# Patient Record
Sex: MALE | Race: WHITE | NOT HISPANIC OR LATINO | Employment: OTHER | URBAN - NONMETROPOLITAN AREA
[De-identification: names, ages, dates, MRNs, and addresses within clinical notes are randomized per-mention and may not be internally consistent; named-entity substitution may affect disease eponyms.]

---

## 2021-01-14 ENCOUNTER — IMMUNIZATION (OUTPATIENT)
Dept: VACCINE CLINIC | Facility: HOSPITAL | Age: 86
End: 2021-01-14

## 2021-01-14 PROCEDURE — 0001A: CPT | Performed by: FAMILY MEDICINE

## 2021-01-14 PROCEDURE — 91300 HC SARSCOV02 VAC 30MCG/0.3ML IM: CPT | Performed by: FAMILY MEDICINE

## 2021-02-04 ENCOUNTER — IMMUNIZATION (OUTPATIENT)
Dept: VACCINE CLINIC | Facility: HOSPITAL | Age: 86
End: 2021-02-04

## 2021-02-04 PROCEDURE — 0002A: CPT | Performed by: INTERNAL MEDICINE

## 2021-02-04 PROCEDURE — 91300 HC SARSCOV02 VAC 30MCG/0.3ML IM: CPT | Performed by: INTERNAL MEDICINE

## 2023-03-23 ENCOUNTER — OFFICE VISIT (OUTPATIENT)
Dept: CARDIOLOGY | Facility: CLINIC | Age: 88
End: 2023-03-23
Payer: MEDICARE

## 2023-03-23 VITALS
SYSTOLIC BLOOD PRESSURE: 174 MMHG | OXYGEN SATURATION: 98 % | HEART RATE: 51 BPM | WEIGHT: 140 LBS | BODY MASS INDEX: 25.76 KG/M2 | HEIGHT: 62 IN | DIASTOLIC BLOOD PRESSURE: 71 MMHG

## 2023-03-23 DIAGNOSIS — I27.20 SEVERE PULMONARY HYPERTENSION: ICD-10-CM

## 2023-03-23 DIAGNOSIS — I63.9 CEREBROVASCULAR ACCIDENT (CVA), UNSPECIFIED MECHANISM: Primary | ICD-10-CM

## 2023-03-23 PROCEDURE — 99204 OFFICE O/P NEW MOD 45 MIN: CPT | Performed by: NURSE PRACTITIONER

## 2023-03-23 PROCEDURE — 1159F MED LIST DOCD IN RCRD: CPT | Performed by: NURSE PRACTITIONER

## 2023-03-23 PROCEDURE — 1160F RVW MEDS BY RX/DR IN RCRD: CPT | Performed by: NURSE PRACTITIONER

## 2023-03-23 RX ORDER — HYDROCHLOROTHIAZIDE 12.5 MG/1
12.5 CAPSULE, GELATIN COATED ORAL
COMMUNITY
Start: 2023-01-12 | End: 2023-03-23

## 2023-03-23 RX ORDER — DOXAZOSIN 8 MG/1
8 TABLET ORAL DAILY
COMMUNITY
Start: 2022-12-19

## 2023-03-23 RX ORDER — ATORVASTATIN CALCIUM 40 MG/1
40 TABLET, FILM COATED ORAL DAILY
COMMUNITY
Start: 2023-02-05

## 2023-03-23 RX ORDER — FLUTICASONE PROPIONATE 110 UG/1
1 AEROSOL, METERED RESPIRATORY (INHALATION)
COMMUNITY
Start: 2022-04-28 | End: 2023-04-28

## 2023-03-23 RX ORDER — ASPIRIN 81 MG/1
81 TABLET ORAL DAILY
COMMUNITY

## 2023-03-23 RX ORDER — LISINOPRIL 40 MG/1
40 TABLET ORAL DAILY
COMMUNITY
Start: 2023-01-12

## 2023-03-23 RX ORDER — MONTELUKAST SODIUM 10 MG/1
10 TABLET ORAL DAILY
COMMUNITY
Start: 2023-01-12

## 2023-03-23 NOTE — PROGRESS NOTES
"Subjective     Kian Lopez is a 89 y.o. male who presents to [facility] for Establish Care (SoB, Edema, [facility] Echo).    CHIEF COMPLIANT  Chief Complaint   Patient presents with   • Establish Care     SoB, Edema, [facility] Echo       Active Problems:  Problem List Items Addressed This Visit    None  Visit Diagnoses     Cerebrovascular accident (CVA), unspecified mechanism (HCC)    -  Primary    Relevant Orders    Adult Transthoracic Echo Limited W/ Cont if Necessary Per Protocol    Severe pulmonary hypertension (HCC)        Relevant Orders    Adult Transthoracic Echo Limited W/ Cont if Necessary Per Protocol      Problem list  1.  Shortness of breath  1.1 echocardiogram 3/23: EF 60%, normal diastolic function, mild to moderate MR and TR, peak PA pressures of 66 mmHg  2.  Lower extremity edema  3.  Stroke  3.1 CTA of the neck no evidence of hemodynamically significant stenosis in the neck, small right pleural effusion  3.2  4.  Severe pulmonary hypertension    HPI  Kian Ventura is a 89-year-old male patient being seen today to establish care for dyspnea, edema, and chronic arterial hypertension.     Patient does have chronic arterial hypertension, which he is on lisinopril and Cardura. Today, his blood pressure is 174/71 mmHg with a heart rate of 51 beats per minute. He is accompanied by an adult female to this visit. He states that when he checks his systolic blood pressure, the second reading decreases to 139 mmHg once he sits down and is relaxed. The patient brought in a blood pressure log today.  His blood pressures were in the 130s and 140s over 70s to 80s on his blood pressure log. [facility] and reports 2 elevated readings.     He adds he was instructed to begin taking 81 mg of aspirin and [facility].    The patient states on 10/28/2022, his left eye \"blacked out\" for approximately 30 minutes. He notes the sensation started on the right side and spans " "across his face, terminating in his left eye. He adds a second episode occurred on 01/25/2023 and was diagnosed with a stroke and adds both episodes occurred while on Eliquis. He was seen by Dr. Mackenzie 10 days later and advised he has a \"blockage.\" He underwent an echocardiogram on 03/01/2023, which did not show any blockages. Dr. Mackenzie then ordered an MRI of his neck and head, which was performed at a local hospital and was unremarkable.  No indications of any hemodynamically significant stenosis in either carotid and CT of the head was normal for age.    The patient reports [facility], who was \"convinced\" he has a blockage despite unremarkable test results.     He reports having a left sided ischemic stroke 04/24/2022 and was told that he had an arrhythmia where the top part of his heart and the bottom part of his heart did not work together.  We did discuss atrial fibrillation however there is no documentation of atrial fibrillation in the records that are available to me.  We did discuss repeating cardiac event monitor however he wishes to defer.  He is on anticoagulation of Eliquis and antiplatelet therapy of Plavix.  He states he was hospitalized for 5 days and prescribed Eliquis for ischemic stroke. He denies residual weakness from the cerebrovascular accident.    The patient experiences shortness of breath with exertion. He denies episodes occurring at rest. He denies smothering when he lays on his back.  He reports that he can go to the gym and exercise 25 to 30 minutes without any problems.  He says he does the treadmill for 10 to 15 minutes on the bicycle for 15 to 20 minutes    He states he has had a heart murmur for 35 years. The patient wore a cardiac monitor in 2022 for 30 days.  He maintains that Dr. Mackenzie feels there is \"something wrong.\" He adds the [facility] noted cardiac dysfunction in the past.     The patient experiences bilateral pedal " "edema. He takes hydrochlorothiazide 12.5 mg but has been advised to switch to Lasix by [facility]. He reports that he will  the new prescription from the pharmacy.    PRIOR MEDS  Current Outpatient Medications on File Prior to Visit   Medication Sig Dispense Refill   • apixaban (ELIQUIS) 5 MG tablet tablet Take 1 tablet by mouth 2 (Two) Times a Day.     • aspirin 81 MG EC tablet Take 1 tablet by mouth Daily.     • atorvastatin (LIPITOR) 40 MG tablet Take 1 tablet by mouth Daily.     • doxazosin (CARDURA) 8 MG tablet Take 1 tablet by mouth Daily.     • fluticasone (FLOVENT HFA) 110 MCG/ACT inhaler Inhale 1 puff.     • Furosemide (LASIX PO) Take  by mouth.     • lisinopril (PRINIVIL,ZESTRIL) 40 MG tablet Take 1 tablet by mouth Daily.     • montelukast (SINGULAIR) 10 MG tablet Take 1 tablet by mouth Daily.       No current facility-administered medications on file prior to visit.       ALLERGIES  Patient has no known allergies.    HISTORY  Past Medical History:   Diagnosis Date   • Stroke (HCC)        Social History     Socioeconomic History   • Marital status:    Tobacco Use   • Smoking status: Never   Substance and Sexual Activity   • Alcohol use: Not Currently   • Drug use: Never   • Sexual activity: Defer       Family History   Problem Relation Age of Onset   • Heart attack Mother    • Heart attack Father        Review of Systems   Constitutional: Negative for fatigue.   HENT: Negative for congestion, rhinorrhea and sore throat.    Eyes: Positive for visual disturbance (states left eye \"blacks out\" for 30-40min. States he's been getting imaging to find out the cause. Stated he was told the cause is likely a blockage somewhere, but imaging didn't show anything. ).        Imaging for eye [facility] at [facility]    Respiratory: Positive for shortness of breath (Sometimes w/ activity or when outside ). Negative for chest tightness.    Cardiovascular: Positive for leg swelling (BLE edema, LLE is worse, " "does not go down overnight ). Negative for chest pain and palpitations.        Pt stated [facility], I told him we will try to obtain, but may need to perform one to review today     Gastrointestinal: Negative.    Genitourinary: Negative.    Neurological: Negative for dizziness, syncope, weakness, light-headedness, numbness and headaches.   Hematological: Bruises/bleeds easily.   Psychiatric/Behavioral: Negative for sleep disturbance.       Objective     VITALS: /71   Pulse 51   Ht 156.2 cm (61.5\")   Wt 63.5 kg (140 lb)   SpO2 98%   BMI 26.02 kg/m²     LABS:   Lab Results (most recent)     None          IMAGING:   No Images in the past 120 days found..    EXAM:  Physical Exam  Vitals and nursing note reviewed.   Constitutional:       Appearance: He is well-developed.   HENT:      Head: Normocephalic and atraumatic.   Eyes:      Pupils: Pupils are equal, round, and reactive to light.   Neck:      Vascular: Carotid bruit present. No JVD.   Cardiovascular:      Rate and Rhythm: Normal rate and regular rhythm.      Pulses:           Carotid pulses are 2+ on the right side and 2+ on the left side.       Radial pulses are 2+ on the right side and 2+ on the left side.        Posterior tibial pulses are 2+ on the right side and 2+ on the left side.      Heart sounds: Murmur ( 2/6 Systolic) heard.     No gallop.   Pulmonary:      Effort: Pulmonary effort is normal. No respiratory distress.      Breath sounds: Normal breath sounds.   Abdominal:      General: Bowel sounds are normal. There is no distension.      Palpations: Abdomen is soft.      Tenderness: There is no abdominal tenderness.   Musculoskeletal:         General: Swelling (2+ BLE) present. Normal range of motion.      Cervical back: Neck supple.   Skin:     General: Skin is warm and dry.   Neurological:      Mental Status: He is alert and oriented to person, place, and time.      Cranial Nerves: No cranial nerve deficit.      Sensory: No sensory " deficit.   Psychiatric:         Speech: Speech normal.         Behavior: Behavior normal.         Thought Content: Thought content normal.         Judgment: Judgment normal.         Procedure   Procedures     PLAN    Assessment & Plan    Diagnosis Plan   1. Cerebrovascular accident (CVA), unspecified mechanism (HCC)  Adult Transthoracic Echo Limited W/ Cont if Necessary Per Protocol      2. Severe pulmonary hypertension (HCC)  Adult Transthoracic Echo Limited W/ Cont if Necessary Per Protocol      Cardiac health maintenance  1. The patient's most recent echocardiogram results were discussed in full detail with the patient.  He does appear to have an ejection fraction of 60% with normal diastolic function with severe pulmonary hypertension of 66 mmHg.  Mild to moderate MR and TR.  We did discuss several different options in regards to his pulmonary hypertension however his shortness of breath is stable and he wishes just to proceed with medication management by switching the hydrochlorothiazide to Lasix at this time.  [facility].  2. Treatment options were discussed in detail with the patient.  We discussed potential echocardiogram with bubble study to rule out PFO, TIGRE, right and left heart catheterization.  We will move forth with echocardiogram with bubble study to rule out PFO.  3. An order has been placed for a transthoracic echocardiogram with bubble study to rule out PFO as a potential cause of his stroke..  4. The patient was advised to continue his current cardiac medication as prescribed.  He denies any bleeding with the Eliquis.  We do recommend that he continue this at this time.  5.  Patient's blood pressure is controlled on current blood pressure medication regimen.  No medication changes are warranted at this time.  Patient advised to monitor blood pressure on a daily basis and report any persistent highs or lows.  Set goal blood pressure for patient at 130/80 or below.  6.   [facility]  7.  Did discuss Mr. Lopez with Dr. Mills.  Bubble study to rule out PFO and continuation of Eliquis was advised.  8.  Informed of signs and symptoms of ACS and advised to seek emergent treatment for any new worsening symptoms.  Patient also advised sooner follow-up as needed.  Also advised to follow-up with family doctor as needed  This note is dictated utilizing voice recognition software.  Although this record has been proof read, transcriptional errors may still be present. If questions occur regarding the content of this record please do not hesitate to call our office.  I have reviewed and confirmed the accuracy of the ROS as documented by the MA/LPN/RN KIMBER Cruz  Assessment  1. Hypertension  2. Shortness of breath  3. Bilateral lower extremity edema    Return in about 3 months (around 6/23/2023), or if symptoms worsen or fail to improve.    Diagnoses and all orders for this visit:    1. Cerebrovascular accident (CVA), unspecified mechanism (HCC) (Primary)  -     Adult Transthoracic Echo Limited W/ Cont if Necessary Per Protocol; Future    2. Severe pulmonary hypertension (HCC)  -     Adult Transthoracic Echo Limited W/ Cont if Necessary Per Protocol; Future        Advance Care Planning   ACP discussion was declined by the patient. Patient has an advance directive (not in EMR), copy requested.         MEDS ORDERED DURING VISIT:  No orders of the defined types were placed in this encounter.          This document has been electronically signed by KIMBER Cruz Jr.  March 24, 2023 08:07 EDT  Transcribed from ambient dictation for KIMBER Cruz by Roxanne Gatuhier.  03/23/23   14:54 EDT    Patient or patient representative verbalized consent to the visit recording.  I have personally performed the services described in this document as transcribed by the above individual, and it is both accurate and complete.

## 2023-03-27 ENCOUNTER — HOSPITAL ENCOUNTER (OUTPATIENT)
Dept: CARDIOLOGY | Facility: HOSPITAL | Age: 88
Discharge: HOME | End: 2023-03-27
Payer: MEDICARE

## 2023-03-27 DIAGNOSIS — I27.20 SEVERE PULMONARY HYPERTENSION: ICD-10-CM

## 2023-03-27 DIAGNOSIS — I63.9 CEREBROVASCULAR ACCIDENT (CVA), UNSPECIFIED MECHANISM: ICD-10-CM

## 2023-03-27 PROCEDURE — 93308 TTE F-UP OR LMTD: CPT

## 2023-03-27 RX ORDER — SODIUM CHLORIDE 9 MG/ML
8 INJECTION INTRAMUSCULAR; INTRAVENOUS; SUBCUTANEOUS ONCE AS NEEDED
Status: COMPLETED | OUTPATIENT
Start: 2023-03-27 | End: 2023-03-27

## 2023-03-27 RX ADMIN — SODIUM CHLORIDE 8 ML: 9 INJECTION, SOLUTION INTRAMUSCULAR; INTRAVENOUS; SUBCUTANEOUS at 15:10

## 2023-03-29 ENCOUNTER — PATIENT ROUNDING (BHMG ONLY) (OUTPATIENT)
Dept: CARDIOLOGY | Facility: CLINIC | Age: 88
End: 2023-03-29
Payer: MEDICARE

## 2023-03-29 NOTE — PROGRESS NOTES
March 29, 2023    Hello, may I speak with Kian Lopez?    My name is TODD FARMER      I am  with [facility] SMRST ALVARADO  [facility]  [address] [place] [id number] [phone].    Before we get started may I verify your date of birth? 11/27/1930    I am calling to officially welcome you to our practice and ask about your recent visit. Is this a good time to talk? yes    Tell me about your visit with us. What things went well?  MY WIFE WAS A PT OF YOUR OFFICE SO I AM FAMILIAR WITH [facility].  I WAS IMPRESSED WITH JR.         We're always looking for ways to make our patients' experiences even better. Do you have recommendations on ways we may improve?  NOT REALLY.  I WAS GLAD TO KNOW HE CAN COMMUNICATE WITH DR. ALVARADO.    Overall were you satisfied with your first visit to our practice? Yes.  MY APPT WAS A LITTLE FAR OUT.         I appreciate you taking the time to speak with me today. Is there anything else I can do for you? no      Thank you, and have a great day.

## 2023-04-10 LAB
BH CV ECHO SHUNT ASSESSMENT PERFORMED (HIDDEN SCRIPTING): 1
MAXIMAL PREDICTED HEART RATE: 128 BPM
STRESS TARGET HR: 109 BPM

## 2023-04-11 ENCOUNTER — TELEPHONE (OUTPATIENT)
Dept: CARDIOLOGY | Facility: CLINIC | Age: 88
End: 2023-04-11
Payer: MEDICARE

## 2023-04-11 NOTE — TELEPHONE ENCOUNTER
Called pt to let him know we didn't have him previously scheduled with Dr. Mills, that he has an appt with Bayron Elise on 04/25/2023. Pt stated that the hospital spoke directly with Dr. Mills and he is supposed to see him. I let the pt know when the hospital says to follow up with Dr. Mills it's general and to see a provider at Dr. Mills's office. I let ot know DR. Mills is booked out to November and that he could see JR this time then follow up with Dr. Mills for his next follow up. Pt stated he would call the hospital to confirm what they said, and call us back. I let pt know I would leave his appt on with JR in April until we hear more.

## 2023-04-11 NOTE — TELEPHONE ENCOUNTER
Caller: Kian Lopez    Relationship: Self    Best call back number: [phone]      Who are you requesting to speak with (clinical staff, provider,  specific staff member): CLINICAL      What was the call regarding: THE PATIENT [facility] SE[facility] HIS DISCHARGE PAPERS STATE THAT HE HAS AN APPT ON 4-23-23 AT 8AM WITH DR. ALVARADO.  THE APPT IN THE CHART IS 4-25-23 WITH HARSH ARREDONDO.  PATIENT DOES NOT AGREE THAT THE 25TH IS THE ONLY APPT.  HE ONLY WANTS TO SEE DR. ALVARADO    Do you require a callback: YES

## 2023-04-11 NOTE — TELEPHONE ENCOUNTER
ECHO  Called patient to notify of no acute findings or abnormalities. Keep follow up as scheduled. If you have any problem between now and then give our office a call.   ----- Message from Sara Miller MA sent at 4/11/2023  9:05 AM EDT -----    ----- Message -----  From: Bayron Cooley APRN  Sent: 4/11/2023   1:15 AM EDT  To: Sara Miller MA    Negative bubble study with an ejection fraction of 61 to 65%

## 2023-04-24 ENCOUNTER — TELEPHONE (OUTPATIENT)
Dept: CARDIOLOGY | Facility: CLINIC | Age: 88
End: 2023-04-24
Payer: MEDICARE

## 2023-06-08 ENCOUNTER — OFFICE VISIT (OUTPATIENT)
Dept: CARDIOLOGY | Facility: CLINIC | Age: 88
End: 2023-06-08
Payer: MEDICARE

## 2023-06-08 VITALS
HEART RATE: 47 BPM | WEIGHT: 144.6 LBS | OXYGEN SATURATION: 97 % | HEIGHT: 61 IN | BODY MASS INDEX: 27.3 KG/M2 | DIASTOLIC BLOOD PRESSURE: 45 MMHG | SYSTOLIC BLOOD PRESSURE: 135 MMHG

## 2023-06-08 DIAGNOSIS — I63.9 CEREBROVASCULAR ACCIDENT (CVA), UNSPECIFIED MECHANISM: ICD-10-CM

## 2023-06-08 DIAGNOSIS — I48.0 PAROXYSMAL ATRIAL FIBRILLATION: ICD-10-CM

## 2023-06-08 DIAGNOSIS — I27.20 SEVERE PULMONARY HYPERTENSION: Primary | ICD-10-CM

## 2023-06-08 DIAGNOSIS — I10 ESSENTIAL HYPERTENSION: ICD-10-CM

## 2023-06-08 RX ORDER — AMLODIPINE BESYLATE 5 MG/1
TABLET ORAL
COMMUNITY
Start: 2023-06-07 | End: 2023-06-08

## 2023-06-08 RX ORDER — HYDRALAZINE HYDROCHLORIDE 50 MG/1
50 TABLET, FILM COATED ORAL 2 TIMES DAILY
Qty: 60 TABLET | Refills: 5 | Status: SHIPPED | OUTPATIENT
Start: 2023-06-08

## 2023-06-08 NOTE — PROGRESS NOTES
"Problem list     Subjective   Kian Lopez is a 89 y.o. male     Chief Complaint   Patient presents with    Follow-up     HOSPITAL F/U, 3 MTH ECHO RESULTS.         Problem list  1.  Shortness of breath  1.1 echocardiogram 3/23: EF 60%, normal diastolic function, mild to moderate MR and TR, peak PA pressures of 66 mmHg  2.  Lower extremity edema  3.  Stroke  3.1 CTA of the neck no evidence of hemodynamically significant stenosis in the neck, small right pleural effusion  3.2 echocardiogram with no evidence of shunting.  4.  Severe pulmonary hypertension  4.1 echocardiogram suggestive of pulmonary pressures in the 60s.  5.  Atrial fibrillation  5 1 patient currently on Eliquis for anticoagulation    HPI    Patient is a 89-year-old male who presents to the office to be evaluated.  Patient recently was hospitalized and discharged from hospital.    He has history of a CVA in the past.  He has been having episodes in which to have a sudden loss or transient loss of vision and he describes it as \"my left eye blacking out\".    This happened once at home and he went to the hospital and extensive work-up for stroke was negative.  MRI showed previous stroke with CTA of the head neck being unremarkable otherwise.  Echocardiogram has been performed outpatient with saline study which was negative for any evidence of shunting and patient has been on Eliquis for anticoagulation.    Patient does not experience any chest pain or pressure.  No complaints of any significant dyspnea.  No PND orthopnea.    He does palpitate at times.  He has had some dizziness on occasion especially upon standing.  He has had some lower extremity edema that has been concerning.  He is stable otherwise.          Current Outpatient Medications on File Prior to Visit   Medication Sig Dispense Refill    apixaban (ELIQUIS) 5 MG tablet tablet Take 1 tablet by mouth 2 (Two) Times a Day.      aspirin 81 MG EC tablet Take 1 tablet by mouth Daily.      " "atorvastatin (LIPITOR) 40 MG tablet Take 1 tablet by mouth Daily.      doxazosin (CARDURA) 8 MG tablet Take 1 tablet by mouth Daily.      Furosemide (LASIX PO) Take  by mouth.      lisinopril (PRINIVIL,ZESTRIL) 40 MG tablet Take 1 tablet by mouth Daily.      montelukast (SINGULAIR) 10 MG tablet Take 1 tablet by mouth Daily.      [DISCONTINUED] amLODIPine (NORVASC) 5 MG tablet       fluticasone (FLOVENT HFA) 110 MCG/ACT inhaler Inhale 1 puff.       No current facility-administered medications on file prior to visit.       Patient has no known allergies.    Past Medical History:   Diagnosis Date    Stroke        Social History     Socioeconomic History    Marital status:    Tobacco Use    Smoking status: Never   Substance and Sexual Activity    Alcohol use: Not Currently    Drug use: Never    Sexual activity: Defer       Family History   Problem Relation Age of Onset    Heart attack Mother     Heart attack Father        Review of Systems   Constitutional:  Negative for appetite change, chills and fever.   HENT: Negative.  Negative for drooling, facial swelling, mouth sores, postnasal drip, sinus pressure, sore throat and trouble swallowing.    Eyes:  Negative for pain, redness, itching and visual disturbance.   Respiratory:  Positive for shortness of breath. Negative for cough, choking and wheezing.    Cardiovascular:  Positive for palpitations and leg swelling. Negative for chest pain.   Gastrointestinal:  Negative for constipation, diarrhea and nausea.   Musculoskeletal:  Negative for back pain, myalgias and neck pain.   Skin:  Negative for rash and wound.   Neurological:  Positive for weakness. Negative for dizziness and numbness.   Psychiatric/Behavioral:  Negative for sleep disturbance.      Objective   Vitals:    06/08/23 1411   BP: 135/45   Pulse: (!) 47   SpO2: 97%   Weight: 65.6 kg (144 lb 9.6 oz)   Height: 156.2 cm (61.5\")      /45   Pulse (!) 47   Ht 156.2 cm (61.5\")   Wt 65.6 kg (144 lb 9.6 " oz)   SpO2 97%   BMI 26.88 kg/m²     Lab Results (most recent)       None            Physical Exam  Vitals and nursing note reviewed.   Constitutional:       General: He is not in acute distress.     Appearance: Normal appearance. He is well-developed.   HENT:      Head: Normocephalic and atraumatic.   Eyes:      General: No scleral icterus.        Right eye: No discharge.         Left eye: No discharge.      Conjunctiva/sclera: Conjunctivae normal.   Neck:      Vascular: No carotid bruit.   Cardiovascular:      Rate and Rhythm: Normal rate and regular rhythm.      Heart sounds: Normal heart sounds. No murmur heard.   with a grade of 1/6.     No friction rub. No gallop. S4 sounds present.      Comments: Left sternal border grade 1/6 to 2/6 systolic murmur  Pulmonary:      Effort: Pulmonary effort is normal. No respiratory distress.      Breath sounds: Normal breath sounds. No wheezing or rales.   Chest:      Chest wall: No tenderness.   Musculoskeletal:      Right lower leg: No edema.      Left lower leg: No edema.   Skin:     General: Skin is warm and dry.      Coloration: Skin is not pale.      Findings: No erythema or rash.   Neurological:      Mental Status: He is alert and oriented to person, place, and time.      Cranial Nerves: No cranial nerve deficit.   Psychiatric:         Behavior: Behavior normal.       Procedure   Procedures       Assessment & Plan     Problems Addressed this Visit          Cardiac and Vasculature    Paroxysmal atrial fibrillation    Essential hypertension    Relevant Medications    hydrALAZINE (APRESOLINE) 50 MG tablet       Neuro    Cerebrovascular accident (CVA)       Other    Severe pulmonary hypertension - Primary     Diagnoses         Codes Comments    Severe pulmonary hypertension    -  Primary ICD-10-CM: I27.20  ICD-9-CM: 416.8     Cerebrovascular accident (CVA), unspecified mechanism     ICD-10-CM: I63.9  ICD-9-CM: 434.91     Paroxysmal atrial fibrillation     ICD-10-CM:  I48.0  ICD-9-CM: 427.31     Essential hypertension     ICD-10-CM: I10  ICD-9-CM: 401.9           Recommendation  1.  Patient is a 89-year-old male with recent hospitalization but work-up being benign.  Antiplatelet therapy was added to his Eliquis and he has done better.  He does not describe any further episodes of loss of consciousness.  He is on Eliquis.  For now, we will make no changes.    2.  Patient with paroxysmal A-fib.  He is doing well on medical therapy.  No complaints of bleeding on Eliquis.  For now, we will monitor.    3.  Significant pulmonary hypertension at this time.  For now, we will treat his symptoms above extremity edema.    4.  Patient with baseline hypertension.  He also has significant lower extremity edema likely from diastolic dysfunction and pulmonary hypertension.  I am stopping amlodipine.  I feel this could be causing some of his edema and will him to continue Lasix.  I will place him on hydralazine to help with his blood pressure.  I want him to call back in 1 to 2 weeks in regards to blood pressure readings.    5.  Otherwise, we discussed repeat event monitoring to evaluate.  He describes having episodes in which he gets dizzy.  He does have some orthostatic issues but he does not seem interested.  Heart rate is also low today.  However, he does not seem interested though we have discussed event monitor more than once.  We will make this adjustment and if symptoms were to worsen, will him to call the office.  He is to follow-up with primary as scheduled.             Kian Lopez  reports that he has never smoked. He does not have any smokeless tobacco history on file..           Advance Care Planning   ACP discussion was declined by the patient. Patient has an advance directive in EMR which is still valid.          Electronically signed by:

## 2023-06-09 ENCOUNTER — TELEPHONE (OUTPATIENT)
Dept: CARDIOLOGY | Facility: CLINIC | Age: 88
End: 2023-06-09

## 2023-06-09 NOTE — TELEPHONE ENCOUNTER
Caller: Kian Lopez     Relationship: [unfilled]     Best call back number: [phone]    What is your medical concern? PATIENT CONCERNED WITH BLOOD PRESSURE. REPORTS HE TOOK IT TODAY AT 8:15AM AND HAD BP /49 HR 99. STATES HE TOOK IT AGAIN AT 8:20AM AND IT /50 .     Is your provider already aware of this issue? YES    Have you been treated for this issue? PATIENT IS TAKING LISINOPRIL 40MG.

## 2023-06-09 NOTE — TELEPHONE ENCOUNTER
A lot of times blood pressure can fluctuate especially with position changes if you are not well hydrated.  It looks as if the lisinopril has your blood pressure well controlled.  If you are having symptoms such as dizziness, lightheadedness, or feel like you are going to pass out we may need to decrease the overall dose of the lisinopril. 8

## 2023-06-09 NOTE — TELEPHONE ENCOUNTER
First attempt to reach pt. Someone answered, but did not speak when I spoke. Unable to reach them when I called back.   For the HUB to read to pt:   Please inform pt of the nessage from JR. Please also advise them not to check BP troo often as it does effect BP readings. Check BP BID or when having symptoms.

## 2023-08-08 ENCOUNTER — OFFICE VISIT (OUTPATIENT)
Dept: CARDIOLOGY | Facility: CLINIC | Age: 88
End: 2023-08-08
Payer: MEDICARE

## 2023-08-08 VITALS
SYSTOLIC BLOOD PRESSURE: 137 MMHG | WEIGHT: 132 LBS | HEIGHT: 61 IN | OXYGEN SATURATION: 97 % | DIASTOLIC BLOOD PRESSURE: 75 MMHG | BODY MASS INDEX: 24.92 KG/M2 | HEART RATE: 64 BPM

## 2023-08-08 DIAGNOSIS — I50.32 CHRONIC DIASTOLIC HEART FAILURE: ICD-10-CM

## 2023-08-08 DIAGNOSIS — I50.9 PLEURAL EFFUSION DUE TO CONGESTIVE HEART FAILURE: ICD-10-CM

## 2023-08-08 DIAGNOSIS — I27.20 SEVERE PULMONARY HYPERTENSION: Primary | ICD-10-CM

## 2023-08-08 PROCEDURE — 99214 OFFICE O/P EST MOD 30 MIN: CPT | Performed by: PHYSICIAN ASSISTANT

## 2023-08-08 RX ORDER — FLUTICASONE PROPIONATE 50 MCG
1 SPRAY, SUSPENSION (ML) NASAL DAILY
COMMUNITY

## 2023-08-08 RX ORDER — SACUBITRIL AND VALSARTAN 49; 51 MG/1; MG/1
1 TABLET, FILM COATED ORAL 2 TIMES DAILY
Qty: 60 TABLET | Refills: 5 | Status: SHIPPED | OUTPATIENT
Start: 2023-08-08

## 2023-08-08 RX ORDER — DAPAGLIFLOZIN 10 MG/1
1 TABLET, FILM COATED ORAL DAILY
Qty: 30 TABLET | Refills: 11 | Status: SHIPPED | OUTPATIENT
Start: 2023-08-08

## 2023-08-08 NOTE — PROGRESS NOTES
Problem list     Subjective   Kian Lopez is a 89 y.o. male     Chief Complaint   Patient presents with    Follow-up     [facility]-Follow up     Problem list  1.  Shortness of breath  1.1 echocardiogram 3/23: EF 60%, normal diastolic function, mild to moderate MR and TR, peak PA pressures of 66 mmHg  2.  Lower extremity edema  3.  Stroke  3.1 CTA of the neck no evidence of hemodynamically significant stenosis in the neck, small right pleural effusion  3.2 echocardiogram with no evidence of shunting.  4.  Severe pulmonary hypertension  4.1 echocardiogram suggestive of pulmonary pressures in the 60s.  5.  Atrial fibrillation  5 1 patient currently on Eliquis for anticoagulation  6.  Diastolic heart failure  7.  Pleural effusion status post thoracentesis July 2023  HPI    Patient is a 89-year-old male who presents to the office for evaluation.  As above, patient recently underwent thoracentesis.  Patient went to the hospital and was admitted and apparently underwent thoracentesis because of a large right pleural effusion with a small left pleural effusion.  Patient has followed with pulmonology.    He has done well.  He is on 40 mg of Lasix.  His LV function is normal.  He does not describe chest pain or pressure but can experience dyspnea at times.  He does have lower extremity edema that is concerning.  He does not describe PND orthopnea.    He does not describe palpitating nor does patient complain of dysrhythmic symptoms..  He is stable otherwise.      Current Outpatient Medications on File Prior to Visit   Medication Sig Dispense Refill    apixaban (ELIQUIS) 5 MG tablet tablet Take 1 tablet by mouth 2 (Two) Times a Day.      aspirin 81 MG EC tablet Take 1 tablet by mouth Daily.      atorvastatin (LIPITOR) 40 MG tablet Take 1 tablet by mouth Daily.      doxazosin (CARDURA) 8 MG tablet Take 1 tablet by mouth Daily.      fluticasone (FLONASE) 50 MCG/ACT nasal spray 1 spray into the nostril(s) as directed by provider  "Daily.      Furosemide (LASIX PO) Take 40 mg by mouth Daily.      hydrALAZINE (APRESOLINE) 50 MG tablet Take 1 tablet by mouth 2 (Two) Times a Day. 60 tablet 5    LEVOTHYROXINE SODIUM PO Take  by mouth.      montelukast (SINGULAIR) 10 MG tablet Take 1 tablet by mouth Daily.      Potassium (POTASSIMIN PO) Take  by mouth.      fluticasone (FLOVENT HFA) 110 MCG/ACT inhaler Inhale 1 puff.       No current facility-administered medications on file prior to visit.       Patient has no known allergies.    Past Medical History:   Diagnosis Date    Stroke        Social History     Socioeconomic History    Marital status:    Tobacco Use    Smoking status: Never   Substance and Sexual Activity    Alcohol use: Not Currently    Drug use: Never    Sexual activity: Defer       Family History   Problem Relation Age of Onset    Heart attack Mother     Heart attack Father        Review of Systems   Constitutional: Negative.    Eyes: Negative.  Negative for visual disturbance.   Respiratory:  Positive for shortness of breath. Negative for apnea, cough, chest tightness and wheezing.    Cardiovascular: Negative.  Negative for chest pain, palpitations and leg swelling.   Gastrointestinal: Negative.  Negative for blood in stool.   Endocrine: Negative.    Genitourinary: Negative.  Negative for hematuria.   Musculoskeletal: Negative.    Skin: Negative.  Negative for color change, rash and wound.   Allergic/Immunologic: Negative.    Neurological: Negative.  Negative for dizziness, syncope, weakness, light-headedness, numbness and headaches.   Hematological: Negative.  Bruises/bleeds easily.   Psychiatric/Behavioral: Negative.  Negative for sleep disturbance.      Objective   Vitals:    08/08/23 1547   BP: 137/75   BP Location: Left arm   Patient Position: Sitting   Cuff Size: Adult   Pulse: 64   SpO2: 97%   Weight: 59.9 kg (132 lb)   Height: 154.9 cm (61\")      /75 (BP Location: Left arm, Patient Position: Sitting, Cuff Size: " "Adult)   Pulse 64   Ht 154.9 cm (61\")   Wt 59.9 kg (132 lb)   SpO2 97%   BMI 24.94 kg/mý     Lab Results (most recent)       None            Physical Exam  Vitals and nursing note reviewed.   Constitutional:       General: He is not in acute distress.     Appearance: Normal appearance. He is well-developed.   HENT:      Head: Normocephalic and atraumatic.   Eyes:      General: No scleral icterus.        Right eye: No discharge.         Left eye: No discharge.      Conjunctiva/sclera: Conjunctivae normal.   Neck:      Vascular: No carotid bruit.   Cardiovascular:      Rate and Rhythm: Normal rate and regular rhythm.      Heart sounds: Normal heart sounds. No murmur heard.    No friction rub. No gallop. S4 sounds present.   Pulmonary:      Effort: Pulmonary effort is normal. No respiratory distress.      Breath sounds: Normal breath sounds. No wheezing or rales.   Chest:      Chest wall: No tenderness.   Musculoskeletal:      Right lower leg: Edema present.      Left lower leg: Edema present.   Skin:     General: Skin is warm and dry.      Coloration: Skin is not pale.      Findings: No erythema or rash.   Neurological:      Mental Status: He is alert and oriented to person, place, and time.      Cranial Nerves: No cranial nerve deficit.   Psychiatric:         Behavior: Behavior normal.       Procedure   Procedures       Assessment & Plan     Problems Addressed this Visit          Cardiac and Vasculature    Chronic diastolic heart failure    Relevant Medications    sacubitril-valsartan (Entresto) 49-51 MG tablet       Pulmonary and Pneumonias    Pleural effusion due to congestive heart failure    Relevant Medications    fluticasone (FLONASE) 50 MCG/ACT nasal spray       Other    Severe pulmonary hypertension - Primary     Diagnoses         Codes Comments    Severe pulmonary hypertension    -  Primary ICD-10-CM: I27.20  ICD-9-CM: 416.8     Chronic diastolic heart failure     ICD-10-CM: I50.32  ICD-9-CM: 428.32     " Pleural effusion due to congestive heart failure     ICD-10-CM: I50.9  ICD-9-CM: 428.0             Recommendation  1.  Patient is a 89-year-old male who presents back for follow-up.  He recently had thoracentesis because of a large right pleural effusion with a small left pleural effusion noted.  He has been on Lasix but continues to have 2-3+ lower extremity edema.  I am stopping lisinopril.  I want him to go on Entresto as well as farxiga.  I am scheduling a nurse visit 1 week in which we will likely repeat blood work to ensure no acidemia electrolyte abnormality.    2.  I feel patient might need increased dose of Lasix.  We would like to optimize medical therapy and see how he responds.    3.  Otherwise, patient has severe pulmonary hypertension but with his age, not sure how aggressive we would be in regards to treatment.  He follows with pulmonology.  Will defer to them at this time.    4.  For now, we will see him back for follow-up in 1 week for symptom check and for blood work.  For any worsening symptoms, I instructed him to call the office.    We instructed about heart failure and weighing himself daily.  Apparently, he was told to take Lasix daily and for increase in weight, double up on his Lasix for 2 to 3 days and then reduce down to 1 daily.  We will agree with this regimen at this time.  For now, he appears stable.  We will make adjustments and see him back for follow-up.  Follow-up with primary as scheduled.           Advance Care Planning   ACP discussion was declined by the patient. Patient has an advance directive (not in EMR), copy requested.    Patient brought in medicine list to appointment, it's been reviewed with patient and med list was updated in the chart.          Electronically signed by:

## 2023-08-08 NOTE — LETTER
August 9, 2023       No Recipients    Patient: Kian Lopez   YOB: 1930   Date of Visit: 8/8/2023       Dear Chon Borrego, DO    Kian Lopez was in my office today. Below is a copy of my note.    If you have questions, please do not hesitate to call me. I look forward to following Kian along with you.         Sincerely,        JANKI Roach        CC:   No Recipients    Problem list     Subjective  Kian Lopez is a 89 y.o. male     Chief Complaint   Patient presents with    Follow-up     [facility]-Follow up     Problem list  1.  Shortness of breath  1.1 echocardiogram 3/23: EF 60%, normal diastolic function, mild to moderate MR and TR, peak PA pressures of 66 mmHg  2.  Lower extremity edema  3.  Stroke  3.1 CTA of the neck no evidence of hemodynamically significant stenosis in the neck, small right pleural effusion  3.2 echocardiogram with no evidence of shunting.  4.  Severe pulmonary hypertension  4.1 echocardiogram suggestive of pulmonary pressures in the 60s.  5.  Atrial fibrillation  5 1 patient currently on Eliquis for anticoagulation  6.  Diastolic heart failure  7.  Pleural effusion status post thoracentesis July 2023  HPI    Patient is a 89-year-old male who presents to the office for evaluation.  As above, patient recently underwent thoracentesis.  Patient went to the hospital and was admitted and apparently underwent thoracentesis because of a large right pleural effusion with a small left pleural effusion.  Patient has followed with pulmonology.    He has done well.  He is on 40 mg of Lasix.  His LV function is normal.  He does not describe chest pain or pressure but can experience dyspnea at times.  He does have lower extremity edema that is concerning.  He does not describe PND orthopnea.    He does not describe palpitating nor does patient complain of dysrhythmic symptoms..  He is stable otherwise.      Current Outpatient Medications on File Prior to Visit   Medication Sig  Dispense Refill    apixaban (ELIQUIS) 5 MG tablet tablet Take 1 tablet by mouth 2 (Two) Times a Day.      aspirin 81 MG EC tablet Take 1 tablet by mouth Daily.      atorvastatin (LIPITOR) 40 MG tablet Take 1 tablet by mouth Daily.      doxazosin (CARDURA) 8 MG tablet Take 1 tablet by mouth Daily.      fluticasone (FLONASE) 50 MCG/ACT nasal spray 1 spray into the nostril(s) as directed by provider Daily.      Furosemide (LASIX PO) Take 40 mg by mouth Daily.      hydrALAZINE (APRESOLINE) 50 MG tablet Take 1 tablet by mouth 2 (Two) Times a Day. 60 tablet 5    LEVOTHYROXINE SODIUM PO Take  by mouth.      montelukast (SINGULAIR) 10 MG tablet Take 1 tablet by mouth Daily.      Potassium (POTASSIMIN PO) Take  by mouth.      fluticasone (FLOVENT HFA) 110 MCG/ACT inhaler Inhale 1 puff.       No current facility-administered medications on file prior to visit.       Patient has no known allergies.    Past Medical History:   Diagnosis Date    Stroke        Social History     Socioeconomic History    Marital status:    Tobacco Use    Smoking status: Never   Substance and Sexual Activity    Alcohol use: Not Currently    Drug use: Never    Sexual activity: Defer       Family History   Problem Relation Age of Onset    Heart attack Mother     Heart attack Father        Review of Systems   Constitutional: Negative.    Eyes: Negative.  Negative for visual disturbance.   Respiratory:  Positive for shortness of breath. Negative for apnea, cough, chest tightness and wheezing.    Cardiovascular: Negative.  Negative for chest pain, palpitations and leg swelling.   Gastrointestinal: Negative.  Negative for blood in stool.   Endocrine: Negative.    Genitourinary: Negative.  Negative for hematuria.   Musculoskeletal: Negative.    Skin: Negative.  Negative for color change, rash and wound.   Allergic/Immunologic: Negative.    Neurological: Negative.  Negative for dizziness, syncope, weakness, light-headedness,  "numbness and headaches.   Hematological: Negative.  Bruises/bleeds easily.   Psychiatric/Behavioral: Negative.  Negative for sleep disturbance.      Objective  Vitals:    08/08/23 1547   BP: 137/75   BP Location: Left arm   Patient Position: Sitting   Cuff Size: Adult   Pulse: 64   SpO2: 97%   Weight: 59.9 kg (132 lb)   Height: 154.9 cm (61\")      /75 (BP Location: Left arm, Patient Position: Sitting, Cuff Size: Adult)   Pulse 64   Ht 154.9 cm (61\")   Wt 59.9 kg (132 lb)   SpO2 97%   BMI 24.94 kg/mý     Lab Results (most recent)       None            Physical Exam  Vitals and nursing note reviewed.   Constitutional:       General: He is not in acute distress.     Appearance: Normal appearance. He is well-developed.   HENT:      Head: Normocephalic and atraumatic.   Eyes:      General: No scleral icterus.        Right eye: No discharge.         Left eye: No discharge.      Conjunctiva/sclera: Conjunctivae normal.   Neck:      Vascular: No carotid bruit.   Cardiovascular:      Rate and Rhythm: Normal rate and regular rhythm.      Heart sounds: Normal heart sounds. No murmur heard.    No friction rub. No gallop. S4 sounds present.   Pulmonary:      Effort: Pulmonary effort is normal. No respiratory distress.      Breath sounds: Normal breath sounds. No wheezing or rales.   Chest:      Chest wall: No tenderness.   Musculoskeletal:      Right lower leg: Edema present.      Left lower leg: Edema present.   Skin:     General: Skin is warm and dry.      Coloration: Skin is not pale.      Findings: No erythema or rash.   Neurological:      Mental Status: He is alert and oriented to person, place, and time.      Cranial Nerves: No cranial nerve deficit.   Psychiatric:         Behavior: Behavior normal.       Procedure  Procedures       Assessment & Plan    Problems Addressed this Visit          Cardiac and Vasculature    Chronic diastolic heart failure    Relevant Medications    sacubitril-valsartan (Entresto) " 49-51 MG tablet       Pulmonary and Pneumonias    Pleural effusion due to congestive heart failure    Relevant Medications    fluticasone (FLONASE) 50 MCG/ACT nasal spray       Other    Severe pulmonary hypertension - Primary     Diagnoses         Codes Comments    Severe pulmonary hypertension    -  Primary ICD-10-CM: I27.20  ICD-9-CM: 416.8     Chronic diastolic heart failure     ICD-10-CM: I50.32  ICD-9-CM: 428.32     Pleural effusion due to congestive heart failure     ICD-10-CM: I50.9  ICD-9-CM: 428.0             Recommendation  1.  Patient is a 89-year-old male who presents back for follow-up.  He recently had thoracentesis because of a large right pleural effusion with a small left pleural effusion noted.  He has been on Lasix but continues to have 2-3+ lower extremity edema.  I am stopping lisinopril.  I want him to go on Entresto as well as farxiga.  I am scheduling a nurse visit 1 week in which we will likely repeat blood work to ensure no acidemia electrolyte abnormality.    2.  I feel patient might need increased dose of Lasix.  We would like to optimize medical therapy and see how he responds.    3.  Otherwise, patient has severe pulmonary hypertension but with his age, not sure how aggressive we would be in regards to treatment.  He follows with pulmonology.  Will defer to them at this time.    4.  For now, we will see him back for follow-up in 1 week for symptom check and for blood work.  For any worsening symptoms, I instructed him to call the office.    We instructed about heart failure and weighing himself daily.  Apparently, he was told to take Lasix daily and for increase in weight, double up on his Lasix for 2 to 3 days and then reduce down to 1 daily.  We will agree with this regimen at this time.  For now, he appears stable.  We will make adjustments and see him back for follow-up.  Follow-up with primary as scheduled.           Advance Care Planning   ACP discussion was declined by the  patient. Patient has an advance directive (not in EMR), copy requested.    Patient brought in medicine list to appointment, it's been reviewed with patient and med list was updated in the chart.          Electronically signed by:

## 2023-08-09 PROBLEM — I50.32 CHRONIC DIASTOLIC HEART FAILURE: Status: ACTIVE | Noted: 2023-08-09

## 2023-08-09 PROBLEM — I50.9 PLEURAL EFFUSION DUE TO CONGESTIVE HEART FAILURE: Status: ACTIVE | Noted: 2023-08-09

## 2023-08-22 ENCOUNTER — CLINICAL SUPPORT (OUTPATIENT)
Dept: CARDIOLOGY | Facility: CLINIC | Age: 88
End: 2023-08-22

## 2023-08-22 ENCOUNTER — LAB (OUTPATIENT)
Dept: CARDIOLOGY | Facility: CLINIC | Age: 88
End: 2023-08-22
Payer: MEDICARE

## 2023-08-22 VITALS
HEIGHT: 61 IN | HEART RATE: 56 BPM | OXYGEN SATURATION: 97 % | WEIGHT: 135.2 LBS | BODY MASS INDEX: 25.53 KG/M2 | DIASTOLIC BLOOD PRESSURE: 48 MMHG | SYSTOLIC BLOOD PRESSURE: 129 MMHG

## 2023-08-22 DIAGNOSIS — I10 ESSENTIAL HYPERTENSION: ICD-10-CM

## 2023-08-22 DIAGNOSIS — R06.02 SOB (SHORTNESS OF BREATH): ICD-10-CM

## 2023-08-22 DIAGNOSIS — I50.32 CHRONIC DIASTOLIC HEART FAILURE: Primary | ICD-10-CM

## 2023-08-22 DIAGNOSIS — I50.32 CHRONIC DIASTOLIC HEART FAILURE: ICD-10-CM

## 2023-08-22 LAB
ANION GAP SERPL CALCULATED.3IONS-SCNC: 10.6 MMOL/L (ref 5–15)
BUN SERPL-MCNC: 18 MG/DL (ref 8–23)
BUN/CREAT SERPL: 17 (ref 7–25)
CALCIUM SPEC-SCNC: 9.4 MG/DL (ref 8.2–9.6)
CHLORIDE SERPL-SCNC: 107 MMOL/L (ref 98–107)
CO2 SERPL-SCNC: 25.4 MMOL/L (ref 22–29)
CREAT SERPL-MCNC: 1.06 MG/DL (ref 0.76–1.27)
EGFRCR SERPLBLD CKD-EPI 2021: 65.8 ML/MIN/1.73
GLUCOSE SERPL-MCNC: 99 MG/DL (ref 65–99)
NT-PROBNP SERPL-MCNC: 1911 PG/ML (ref 0–1800)
POTASSIUM SERPL-SCNC: 4.6 MMOL/L (ref 3.5–5.2)
SODIUM SERPL-SCNC: 143 MMOL/L (ref 136–145)

## 2023-08-22 PROCEDURE — 80048 BASIC METABOLIC PNL TOTAL CA: CPT | Performed by: PHYSICIAN ASSISTANT

## 2023-08-22 PROCEDURE — 83880 ASSAY OF NATRIURETIC PEPTIDE: CPT | Performed by: PHYSICIAN ASSISTANT

## 2023-08-22 RX ORDER — DAPAGLIFLOZIN 10 MG/1
1 TABLET, FILM COATED ORAL DAILY
Qty: 90 TABLET | Refills: 3 | Status: SHIPPED | OUTPATIENT
Start: 2023-08-22 | End: 2023-09-06 | Stop reason: ALTCHOICE

## 2023-08-22 RX ORDER — SACUBITRIL AND VALSARTAN 49; 51 MG/1; MG/1
1 TABLET, FILM COATED ORAL 2 TIMES DAILY
Qty: 180 TABLET | Refills: 3 | Status: SHIPPED | OUTPATIENT
Start: 2023-08-22

## 2023-08-22 NOTE — PROGRESS NOTES
"Kian Lopez  11/27/1930 8/22/2023   ?   Chief Complaint   Patient presents with    Hypertension     BP check      ?   HPI:   ?   Per 8/8 office visit:    \"1.  Patient is a 89-year-old male who presents back for follow-up.  He recently had thoracentesis because of a large right pleural effusion with a small left pleural effusion noted.  He has been on Lasix but continues to have 2-3+ lower extremity edema.  I am stopping lisinopril.  I want him to go on Entresto as well as farxiga.  I am scheduling a nurse visit 1 week in which we will likely repeat blood work to ensure no acidemia electrolyte abnormality.     2.  I feel patient might need increased dose of Lasix.  We would like to optimize medical therapy and see how he responds.     3.  Otherwise, patient has severe pulmonary hypertension but with his age, not sure how aggressive we would be in regards to treatment.  He follows with pulmonology.  Will defer to them at this time.     4.  For now, we will see him back for follow-up in 1 week for symptom check and for blood work.  For any worsening symptoms, I instructed him to call the office.     We instructed about heart failure and weighing himself daily.  Apparently, he was told to take Lasix daily and for increase in weight, double up on his Lasix for 2 to 3 days and then reduce down to 1 daily.  We will agree with this regimen at this time.  For now, he appears stable.  We will make adjustments and see him back for follow-up.  Follow-up with primary as scheduled.\"?   ?    Pt states that he is feeling much better since his last visit with no symptoms to speak of.         Current Outpatient Medications:     apixaban (ELIQUIS) 5 MG tablet tablet, Take 1 tablet by mouth 2 (Two) Times a Day., Disp: , Rfl:     aspirin 81 MG EC tablet, Take 1 tablet by mouth Daily., Disp: , Rfl:     atorvastatin (LIPITOR) 40 MG tablet, Take 1 tablet by mouth Daily., Disp: , Rfl:     dapagliflozin Propanediol (Farxiga) 10 MG tablet, " Take 10 mg by mouth Daily., Disp: 30 tablet, Rfl: 11    doxazosin (CARDURA) 8 MG tablet, Take 1 tablet by mouth Daily., Disp: , Rfl:     fluticasone (FLONASE) 50 MCG/ACT nasal spray, 1 spray into the nostril(s) as directed by provider Daily., Disp: , Rfl:     fluticasone (FLOVENT HFA) 110 MCG/ACT inhaler, Inhale 1 puff., Disp: , Rfl:     Furosemide (LASIX PO), Take 40 mg by mouth Daily., Disp: , Rfl:     hydrALAZINE (APRESOLINE) 50 MG tablet, Take 1 tablet by mouth 2 (Two) Times a Day., Disp: 60 tablet, Rfl: 5    LEVOTHYROXINE SODIUM PO, Take  by mouth., Disp: , Rfl:     montelukast (SINGULAIR) 10 MG tablet, Take 1 tablet by mouth Daily., Disp: , Rfl:     Potassium (POTASSIMIN PO), Take  by mouth., Disp: , Rfl:     sacubitril-valsartan (Entresto) 49-51 MG tablet, Take 1 tablet by mouth 2 (Two) Times a Day., Disp: 60 tablet, Rfl: 5   ?   ?   Patient has no known allergies.       Procedures     ?   Assessment & Plan    ?   ? Per Chaparro Fajardo, Pt is to continue current medication regimen and follow up routinely.    Pt verbalized understanding of instructions and will contact the office if symptoms worsen.    Shaw Hensley MA  ?

## 2023-08-25 ENCOUNTER — TELEPHONE (OUTPATIENT)
Dept: CARDIOLOGY | Facility: CLINIC | Age: 88
End: 2023-08-25
Payer: MEDICARE

## 2023-08-25 NOTE — TELEPHONE ENCOUNTER
Pt came into the office to check on lab results, let pt know they weren't ready and that juan francisco would be back in the office monday

## 2023-09-06 ENCOUNTER — TELEPHONE (OUTPATIENT)
Dept: CARDIOLOGY | Facility: CLINIC | Age: 88
End: 2023-09-06
Payer: MEDICARE

## 2023-09-06 NOTE — TELEPHONE ENCOUNTER
[facility] called to see if Farxiga could be changed to Jardiance as it is preferred and covered with [facility].    OK per Chaparro Fajardo PA-C to change as requested.

## 2023-09-07 ENCOUNTER — DOCUMENTATION (OUTPATIENT)
Dept: CARDIOLOGY | Facility: CLINIC | Age: 88
End: 2023-09-07
Payer: MEDICARE

## 2023-09-07 NOTE — PROGRESS NOTES
L/M CHECKING ON STATUS OF JARDIANCE APPROVAL THROUGH [facility]. PER NOTE YEST. MED NOT COVERED BU JARDIANCE IS. SO SCRIPT FOR JARDIANCE WAS SENT TO [facility]. L/M AT NUMBER LEFT BY PATIENT EXPLAINING ABOVE. PH,LPN

## 2023-11-08 ENCOUNTER — OFFICE VISIT (OUTPATIENT)
Dept: CARDIOLOGY | Facility: CLINIC | Age: 88
End: 2023-11-08
Payer: MEDICARE

## 2023-11-08 VITALS
DIASTOLIC BLOOD PRESSURE: 51 MMHG | HEART RATE: 66 BPM | OXYGEN SATURATION: 97 % | HEIGHT: 64 IN | WEIGHT: 127 LBS | BODY MASS INDEX: 21.68 KG/M2 | SYSTOLIC BLOOD PRESSURE: 116 MMHG

## 2023-11-08 DIAGNOSIS — I48.0 PAROXYSMAL ATRIAL FIBRILLATION: ICD-10-CM

## 2023-11-08 DIAGNOSIS — I50.32 CHRONIC DIASTOLIC HEART FAILURE: Primary | ICD-10-CM

## 2023-11-08 DIAGNOSIS — I27.20 SEVERE PULMONARY HYPERTENSION: ICD-10-CM

## 2023-11-08 PROCEDURE — 99214 OFFICE O/P EST MOD 30 MIN: CPT | Performed by: PHYSICIAN ASSISTANT

## 2023-11-08 NOTE — PROGRESS NOTES
Problem list     Subjective   Kian Lopez is a 89 y.o. male     Chief Complaint   Patient presents with    Follow-up   Problem list  1.  Shortness of breath  1.1 echocardiogram 3/23: EF 60%, normal diastolic function, mild to moderate MR and TR, peak PA pressures of 66 mmHg  2.  Lower extremity edema  3.  Stroke  3.1 CTA of the neck no evidence of hemodynamically significant stenosis in the neck, small right pleural effusion  3.2 echocardiogram with no evidence of shunting.  4.  Severe pulmonary hypertension  4.1 echocardiogram suggestive of pulmonary pressures in the 60s.  5.  Atrial fibrillation  5 1 patient currently on Eliquis for anticoagulation  6.  Diastolic heart failure  7.  Pleural effusion status post thoracentesis July 2023    HPI    Patient is a 89-year-old male that presents back to the office for routine cardiac follow-up.  He has a degree of diastolic heart failure and right-sided failure from severe pulmonary hypertension.  He follows with pulmonology as well.    We had adjusted medication recently placing patient on an SGLT2 inhibitor as well as Entresto.    Patient has done remarkably well.  His edema has improved and his dyspnea.  He does not describe chest pain or pressure.  He still has a degree of dyspnea if he was to exert for extended levels.  He does not describe PND or orthopnea.    He does not palpitate or complain of dysrhythmic symptoms.  He has done well otherwise.      Current Outpatient Medications on File Prior to Visit   Medication Sig Dispense Refill    apixaban (ELIQUIS) 2.5 MG tablet tablet Take 1 tablet by mouth 2 (Two) Times a Day.      aspirin 81 MG EC tablet Take 1 tablet by mouth Daily.      atorvastatin (LIPITOR) 40 MG tablet Take 1 tablet by mouth Daily.      doxazosin (CARDURA) 8 MG tablet Take 1 tablet by mouth Daily.      empagliflozin (JARDIANCE) 10 MG tablet tablet Take 1 tablet by mouth Daily. 90 tablet 3    fluticasone (FLONASE) 50 MCG/ACT nasal spray 1 spray into  the nostril(s) as directed by provider Daily.      Furosemide (LASIX PO) Take 40 mg by mouth Daily.      hydrALAZINE (APRESOLINE) 50 MG tablet Take 1 tablet by mouth 2 (Two) Times a Day. 60 tablet 5    LEVOTHYROXINE SODIUM PO Take  by mouth.      montelukast (SINGULAIR) 10 MG tablet Take 1 tablet by mouth Daily.      Potassium (POTASSIMIN PO) Take  by mouth.      sacubitril-valsartan (Entresto) 49-51 MG tablet Take 1 tablet by mouth 2 (Two) Times a Day. 180 tablet 3    fluticasone (FLOVENT HFA) 110 MCG/ACT inhaler Inhale 1 puff.      [DISCONTINUED] apixaban (ELIQUIS) 5 MG tablet tablet Take 1 tablet by mouth 2 (Two) Times a Day.       No current facility-administered medications on file prior to visit.       Patient has no known allergies.    Past Medical History:   Diagnosis Date    Stroke        Social History     Socioeconomic History    Marital status:    Tobacco Use    Smoking status: Never   Substance and Sexual Activity    Alcohol use: Not Currently    Drug use: Never    Sexual activity: Defer       Family History   Problem Relation Age of Onset    Heart attack Mother     Heart attack Father        Review of Systems   Constitutional: Negative.    HENT: Negative.     Eyes: Negative.    Respiratory:  Positive for shortness of breath. Negative for apnea, cough, chest tightness and wheezing.    Cardiovascular: Negative.  Negative for chest pain, palpitations and leg swelling.   Gastrointestinal: Negative.  Negative for blood in stool.   Endocrine: Negative.    Genitourinary: Negative.  Negative for hematuria.   Musculoskeletal: Negative.    Skin: Negative.  Negative for color change, rash and wound.   Allergic/Immunologic: Negative.    Neurological:  Negative for dizziness, syncope, weakness, light-headedness and numbness.   Hematological: Negative.  Does not bruise/bleed easily.   Psychiatric/Behavioral:  Negative for sleep disturbance.        Objective   Vitals:    11/08/23 1404   BP: 116/51   Pulse: 66  "  SpO2: 97%   Weight: 57.6 kg (127 lb)   Height: 162.6 cm (64\")      /51   Pulse 66   Ht 162.6 cm (64\")   Wt 57.6 kg (127 lb)   SpO2 97%   BMI 21.80 kg/m²     Lab Results (most recent)       None            Physical Exam  Vitals and nursing note reviewed.   Constitutional:       General: He is not in acute distress.     Appearance: Normal appearance. He is well-developed.   HENT:      Head: Normocephalic and atraumatic.   Eyes:      General: No scleral icterus.        Right eye: No discharge.         Left eye: No discharge.      Conjunctiva/sclera: Conjunctivae normal.   Neck:      Vascular: No carotid bruit.   Cardiovascular:      Rate and Rhythm: Normal rate and regular rhythm.      Heart sounds: Normal heart sounds. No murmur heard.     No friction rub. No gallop.      Comments: Trace to mild edema bilaterally in the lower extremities  Pulmonary:      Effort: Pulmonary effort is normal. No respiratory distress.      Breath sounds: Normal breath sounds. No wheezing or rales.   Chest:      Chest wall: No tenderness.   Musculoskeletal:      Right lower leg: No edema.      Left lower leg: No edema.   Skin:     General: Skin is warm and dry.      Coloration: Skin is not pale.      Findings: No erythema or rash.   Neurological:      Mental Status: He is alert and oriented to person, place, and time.      Cranial Nerves: No cranial nerve deficit.   Psychiatric:         Behavior: Behavior normal.         Procedure   Procedures       Assessment & Plan     Problems Addressed this Visit          Cardiac and Vasculature    Paroxysmal atrial fibrillation    Chronic diastolic heart failure - Primary       Other    Severe pulmonary hypertension     Diagnoses         Codes Comments    Chronic diastolic heart failure    -  Primary ICD-10-CM: I50.32  ICD-9-CM: 428.32     Severe pulmonary hypertension     ICD-10-CM: I27.20  ICD-9-CM: 416.8     Paroxysmal atrial fibrillation     ICD-10-CM: I48.0  ICD-9-CM: 427.31       "     Recommendation  1.  Patient is a 89-year-old male who presents back to the office for follow-up.  He has history of chronic diastolic heart failure but appears to be euvolemic with mild edema in lower extremities.  For now, with improvement of symptoms, we will continue to manage medically.    2.  Patient with atrial fibrillation doing well on current treatment with no complaints of bleeding on Eliquis nor symptoms of cerebral embolic events.  We will monitor for now.    3.  Patient with pulmonary hypertension follows with pulmonology.  We can continue to monitor.  They will optimize him from a pulmonary standpoint.  We can continue to monitor pulmonary pressures with echocardiograms.  He seems to have improved clinically.  We will see him back for follow-up in 6 months or sooner as symptoms discussed.  Follow-up with primary as scheduled.           Patient did not bring med list or medicine bottles to appointment, med list has been reviewed and updated based on patient's knowledge of their meds.      Advance Care Planning   ACP discussion was declined by the patient. Patient has an advance directive (not in EMR), copy requested.      Electronically signed by:

## 2023-11-08 NOTE — LETTER
November 8, 2023       No Recipients    Patient: Kian Lopez   YOB: 1930   Date of Visit: 11/8/2023       Dear Chon Borrego, DO    Kian Lopez was in my office today. Below is a copy of my note.    If you have questions, please do not hesitate to call me. I look forward to following Kian along with you.         Sincerely,        JANKI Roach        CC:   No Recipients    Problem list     Subjective  Kian Lopez is a 89 y.o. male     Chief Complaint   Patient presents with   • Follow-up   Problem list  1.  Shortness of breath  1.1 echocardiogram 3/23: EF 60%, normal diastolic function, mild to moderate MR and TR, peak PA pressures of 66 mmHg  2.  Lower extremity edema  3.  Stroke  3.1 CTA of the neck no evidence of hemodynamically significant stenosis in the neck, small right pleural effusion  3.2 echocardiogram with no evidence of shunting.  4.  Severe pulmonary hypertension  4.1 echocardiogram suggestive of pulmonary pressures in the 60s.  5.  Atrial fibrillation  5 1 patient currently on Eliquis for anticoagulation  6.  Diastolic heart failure  7.  Pleural effusion status post thoracentesis July 2023    HPI    Patient is a 89-year-old male that presents back to the office for routine cardiac follow-up.  He has a degree of diastolic heart failure and right-sided failure from severe pulmonary hypertension.  He follows with pulmonology as well.    We had adjusted medication recently placing patient on an SGLT2 inhibitor as well as Entresto.    Patient has done remarkably well.  His edema has improved and his dyspnea.  He does not describe chest pain or pressure.  He still has a degree of dyspnea if he was to exert for extended levels.  He does not describe PND or orthopnea.    He does not palpitate or complain of dysrhythmic symptoms.  He has done well otherwise.      Current Outpatient Medications on File Prior to Visit   Medication Sig Dispense Refill   • apixaban (ELIQUIS) 2.5 MG  tablet tablet Take 1 tablet by mouth 2 (Two) Times a Day.     • aspirin 81 MG EC tablet Take 1 tablet by mouth Daily.     • atorvastatin (LIPITOR) 40 MG tablet Take 1 tablet by mouth Daily.     • doxazosin (CARDURA) 8 MG tablet Take 1 tablet by mouth Daily.     • empagliflozin (JARDIANCE) 10 MG tablet tablet Take 1 tablet by mouth Daily. 90 tablet 3   • fluticasone (FLONASE) 50 MCG/ACT nasal spray 1 spray into the nostril(s) as directed by provider Daily.     • Furosemide (LASIX PO) Take 40 mg by mouth Daily.     • hydrALAZINE (APRESOLINE) 50 MG tablet Take 1 tablet by mouth 2 (Two) Times a Day. 60 tablet 5   • LEVOTHYROXINE SODIUM PO Take  by mouth.     • montelukast (SINGULAIR) 10 MG tablet Take 1 tablet by mouth Daily.     • Potassium (POTASSIMIN PO) Take  by mouth.     • sacubitril-valsartan (Entresto) 49-51 MG tablet Take 1 tablet by mouth 2 (Two) Times a Day. 180 tablet 3   • fluticasone (FLOVENT HFA) 110 MCG/ACT inhaler Inhale 1 puff.     • [DISCONTINUED] apixaban (ELIQUIS) 5 MG tablet tablet Take 1 tablet by mouth 2 (Two) Times a Day.       No current facility-administered medications on file prior to visit.       Patient has no known allergies.    Past Medical History:   Diagnosis Date   • Stroke        Social History     Socioeconomic History   • Marital status:    Tobacco Use   • Smoking status: Never   Substance and Sexual Activity   • Alcohol use: Not Currently   • Drug use: Never   • Sexual activity: Defer       Family History   Problem Relation Age of Onset   • Heart attack Mother    • Heart attack Father        Review of Systems   Constitutional: Negative.    HENT: Negative.     Eyes: Negative.    Respiratory:  Positive for shortness of breath. Negative for apnea, cough, chest tightness and wheezing.    Cardiovascular: Negative.  Negative for chest pain, palpitations and leg swelling.   Gastrointestinal: Negative.  Negative for blood in stool.   Endocrine: Negative.    Genitourinary: Negative.  " Negative for hematuria.   Musculoskeletal: Negative.    Skin: Negative.  Negative for color change, rash and wound.   Allergic/Immunologic: Negative.    Neurological:  Negative for dizziness, syncope, weakness, light-headedness and numbness.   Hematological: Negative.  Does not bruise/bleed easily.   Psychiatric/Behavioral:  Negative for sleep disturbance.        Objective  Vitals:    11/08/23 1404   BP: 116/51   Pulse: 66   SpO2: 97%   Weight: 57.6 kg (127 lb)   Height: 162.6 cm (64\")      /51   Pulse 66   Ht 162.6 cm (64\")   Wt 57.6 kg (127 lb)   SpO2 97%   BMI 21.80 kg/m²     Lab Results (most recent)       None            Physical Exam  Vitals and nursing note reviewed.   Constitutional:       General: He is not in acute distress.     Appearance: Normal appearance. He is well-developed.   HENT:      Head: Normocephalic and atraumatic.   Eyes:      General: No scleral icterus.        Right eye: No discharge.         Left eye: No discharge.      Conjunctiva/sclera: Conjunctivae normal.   Neck:      Vascular: No carotid bruit.   Cardiovascular:      Rate and Rhythm: Normal rate and regular rhythm.      Heart sounds: Normal heart sounds. No murmur heard.     No friction rub. No gallop.      Comments: Trace to mild edema bilaterally in the lower extremities  Pulmonary:      Effort: Pulmonary effort is normal. No respiratory distress.      Breath sounds: Normal breath sounds. No wheezing or rales.   Chest:      Chest wall: No tenderness.   Musculoskeletal:      Right lower leg: No edema.      Left lower leg: No edema.   Skin:     General: Skin is warm and dry.      Coloration: Skin is not pale.      Findings: No erythema or rash.   Neurological:      Mental Status: He is alert and oriented to person, place, and time.      Cranial Nerves: No cranial nerve deficit.   Psychiatric:         Behavior: Behavior normal.         Procedure  Procedures       Assessment & Plan    Problems Addressed this Visit          " Cardiac and Vasculature    Paroxysmal atrial fibrillation    Chronic diastolic heart failure - Primary       Other    Severe pulmonary hypertension     Diagnoses         Codes Comments    Chronic diastolic heart failure    -  Primary ICD-10-CM: I50.32  ICD-9-CM: 428.32     Severe pulmonary hypertension     ICD-10-CM: I27.20  ICD-9-CM: 416.8     Paroxysmal atrial fibrillation     ICD-10-CM: I48.0  ICD-9-CM: 427.31           Recommendation  1.  Patient is a 89-year-old male who presents back to the office for follow-up.  He has history of chronic diastolic heart failure but appears to be euvolemic with mild edema in lower extremities.  For now, with improvement of symptoms, we will continue to manage medically.    2.  Patient with atrial fibrillation doing well on current treatment with no complaints of bleeding on Eliquis nor symptoms of cerebral embolic events.  We will monitor for now.    3.  Patient with pulmonary hypertension follows with pulmonology.  We can continue to monitor.  They will optimize him from a pulmonary standpoint.  We can continue to monitor pulmonary pressures with echocardiograms.  He seems to have improved clinically.  We will see him back for follow-up in 6 months or sooner as symptoms discussed.  Follow-up with primary as scheduled.           Patient did not bring med list or medicine bottles to appointment, med list has been reviewed and updated based on patient's knowledge of their meds.      Advance Care Planning  ACP discussion was declined by the patient. Patient has an advance directive (not in EMR), copy requested.      Electronically signed by:

## 2023-12-04 RX ORDER — HYDRALAZINE HYDROCHLORIDE 50 MG/1
50 TABLET, FILM COATED ORAL 2 TIMES DAILY
Qty: 60 TABLET | Refills: 5 | Status: SHIPPED | OUTPATIENT
Start: 2023-12-04

## 2024-04-10 ENCOUNTER — OFFICE VISIT (OUTPATIENT)
Dept: CARDIOLOGY | Facility: CLINIC | Age: 89
End: 2024-04-10
Payer: MEDICARE

## 2024-04-10 VITALS
SYSTOLIC BLOOD PRESSURE: 129 MMHG | BODY MASS INDEX: 23.79 KG/M2 | DIASTOLIC BLOOD PRESSURE: 73 MMHG | WEIGHT: 126 LBS | HEART RATE: 63 BPM | OXYGEN SATURATION: 97 % | HEIGHT: 61 IN

## 2024-04-10 DIAGNOSIS — I50.32 CHRONIC DIASTOLIC HEART FAILURE: Primary | ICD-10-CM

## 2024-04-10 DIAGNOSIS — I48.20 ATRIAL FIBRILLATION, CHRONIC: ICD-10-CM

## 2024-04-10 DIAGNOSIS — I27.20 PULMONARY HYPERTENSION: ICD-10-CM

## 2024-04-10 PROCEDURE — 99214 OFFICE O/P EST MOD 30 MIN: CPT | Performed by: PHYSICIAN ASSISTANT

## 2024-04-10 RX ORDER — OXYBUTYNIN CHLORIDE 10 MG/1
10 TABLET, EXTENDED RELEASE ORAL DAILY
COMMUNITY

## 2024-04-10 RX ORDER — ALBUTEROL SULFATE 2.5 MG/3ML
2.5 SOLUTION RESPIRATORY (INHALATION) EVERY 4 HOURS PRN
COMMUNITY

## 2024-04-10 NOTE — LETTER
April 10, 2024       No Recipients    Patient: Kian Lopez   YOB: 1930   Date of Visit: 4/10/2024       Dear Chon Borrego, DO    Kian Lopez was in my office today. Below is a copy of my note.    If you have questions, please do not hesitate to call me. I look forward to following Kian along with you.         Sincerely,        JANKI Roach        CC:   No Recipients    Problem list     Subjective  Kian Lopez is a 89 y.o. male     Chief Complaint   Patient presents with   • Follow-up   Problem list  1.  Shortness of breath  1.1 echocardiogram 3/23: EF 60%, normal diastolic function, mild to moderate MR and TR, peak PA pressures of 66 mmHg  2.  Lower extremity edema  3.  Stroke  3.1 CTA of the neck no evidence of hemodynamically significant stenosis in the neck, small right pleural effusion  3.2 echocardiogram with no evidence of shunting.  4.  Severe pulmonary hypertension  4.1 echocardiogram suggestive of pulmonary pressures in the 60s.  5.  Atrial fibrillation  5 1 patient currently on Eliquis for anticoagulation  6.  Diastolic heart failure  7.  Pleural effusion status post thoracentesis July 2023       HPI    Patient is a 89-year-old male who presents back to the office for follow-up.  He has done relatively well.  Patient was recently placed on Entresto as well as Farxiga and notes significant improvement.  Patient has diastolic heart failure with severe pulm hypertension.  He has history of pleural effusion in the past    He has no chest pain or pressure and his dyspnea has improved.  He does not describe PND or orthopnea.  He continues to have mild lower extremity edema but it appears to be manageable.    He does not describe palpitating nor does he complain of dysrhythmic symptoms.  He does not describe any symptoms of bleeding on anticoagulation.  He does not describe symptoms of cerebral embolic events.  He is stable otherwise.    Current Outpatient Medications on File  Prior to Visit   Medication Sig Dispense Refill   • albuterol (PROVENTIL) (2.5 MG/3ML) 0.083% nebulizer solution Take 2.5 mg by nebulization Every 4 (Four) Hours As Needed for Wheezing.     • apixaban (ELIQUIS) 2.5 MG tablet tablet Take 1 tablet by mouth 2 (Two) Times a Day.     • aspirin 81 MG EC tablet Take 1 tablet by mouth Daily.     • atorvastatin (LIPITOR) 40 MG tablet Take 1 tablet by mouth Daily.     • doxazosin (CARDURA) 8 MG tablet Take 1 tablet by mouth Daily.     • empagliflozin (JARDIANCE) 10 MG tablet tablet Take 1 tablet by mouth Daily. 90 tablet 3   • fluticasone (FLONASE) 50 MCG/ACT nasal spray 1 spray into the nostril(s) as directed by provider Daily.     • Furosemide (LASIX PO) Take 40 mg by mouth Daily.     • hydrALAZINE (APRESOLINE) 50 MG tablet TAKE 1 TABLET BY MOUTH TWICE A DAY 60 tablet 5   • LEVOTHYROXINE SODIUM PO Take 12.5 mcg by mouth.     • Methylcobalamin (B12-ACTIVE PO) Take  by mouth.     • montelukast (SINGULAIR) 10 MG tablet Take 1 tablet by mouth Daily.     • oxybutynin XL (DITROPAN-XL) 10 MG 24 hr tablet Take 1 tablet by mouth Daily.     • Potassium (POTASSIMIN PO) Take  by mouth.     • sacubitril-valsartan (Entresto) 49-51 MG tablet Take 1 tablet by mouth 2 (Two) Times a Day. 180 tablet 3   • fluticasone (FLOVENT HFA) 110 MCG/ACT inhaler Inhale 1 puff.       No current facility-administered medications on file prior to visit.       Patient has no known allergies.    Past Medical History:   Diagnosis Date   • Stroke        Social History     Socioeconomic History   • Marital status:    Tobacco Use   • Smoking status: Never   Substance and Sexual Activity   • Alcohol use: Not Currently   • Drug use: Never   • Sexual activity: Defer       Family History   Problem Relation Age of Onset   • Heart attack Mother    • Heart attack Father        Review of Systems   Constitutional: Negative.  Negative for activity change, appetite change, chills, fatigue and fever.   HENT: Negative.  " Negative for congestion, sinus pressure and sinus pain.    Eyes: Negative.  Negative for visual disturbance.   Respiratory: Negative.  Negative for apnea, cough, chest tightness, shortness of breath and wheezing.    Cardiovascular:  Negative for chest pain, palpitations and leg swelling.   Gastrointestinal: Negative.  Negative for blood in stool.   Endocrine: Negative.  Negative for cold intolerance and heat intolerance.   Genitourinary: Negative.  Negative for hematuria.   Musculoskeletal: Negative.  Negative for gait problem.   Skin: Negative.  Negative for color change, rash and wound.   Allergic/Immunologic: Negative.  Negative for environmental allergies and food allergies.   Neurological: Negative.  Negative for dizziness, syncope, weakness, light-headedness, numbness and headaches.   Hematological: Negative.  Bruises/bleeds easily.   Psychiatric/Behavioral: Negative.  Negative for sleep disturbance.        Objective  Vitals:    04/10/24 1408   BP: 129/73   BP Location: Right arm   Patient Position: Sitting   Cuff Size: Adult   Pulse: 63   SpO2: 97%   Weight: 57.2 kg (126 lb)   Height: 154.9 cm (61\")      /73 (BP Location: Right arm, Patient Position: Sitting, Cuff Size: Adult)   Pulse 63   Ht 154.9 cm (61\")   Wt 57.2 kg (126 lb)   SpO2 97%   BMI 23.81 kg/m²     Lab Results (most recent)       None            Physical Exam  Vitals and nursing note reviewed.   Constitutional:       General: He is not in acute distress.     Appearance: Normal appearance. He is well-developed.   HENT:      Head: Normocephalic and atraumatic.   Eyes:      General: No scleral icterus.        Right eye: No discharge.         Left eye: No discharge.      Conjunctiva/sclera: Conjunctivae normal.   Neck:      Vascular: No carotid bruit.   Cardiovascular:      Rate and Rhythm: Normal rate. Rhythm irregularly irregular.      Heart sounds: Normal heart sounds. No murmur heard.     No friction rub. No gallop.   Pulmonary:      " Effort: Pulmonary effort is normal. No respiratory distress.      Breath sounds: Normal breath sounds. No wheezing or rales.   Chest:      Chest wall: No tenderness.   Musculoskeletal:      Right lower le+ No edema.      Left lower le+ No edema.   Skin:     General: Skin is warm and dry.      Coloration: Skin is not pale.      Findings: No erythema or rash.   Neurological:      Mental Status: He is alert and oriented to person, place, and time.      Cranial Nerves: No cranial nerve deficit.   Psychiatric:         Behavior: Behavior normal.         Procedure  Procedures       Assessment & Plan    Problems Addressed this Visit          Cardiac and Vasculature    Chronic diastolic heart failure - Primary    Atrial fibrillation, chronic       Pulmonary and Pneumonias    Pulmonary hypertension     Diagnoses         Codes Comments    Chronic diastolic heart failure    -  Primary ICD-10-CM: I50.32  ICD-9-CM: 428.32     Pulmonary hypertension     ICD-10-CM: I27.20  ICD-9-CM: 416.8     Atrial fibrillation, chronic     ICD-10-CM: I48.20  ICD-9-CM: 427.31           Recommendation  1.  Patient is a 89-year-old male presenting back to the office for routine cardiac assessment.  He has chronic atrial fibrillation doing well.  He has no complaints of bleeding on anticoagulation.  For now, we can monitor.  He feels stable at this time.  He has no complaints of bleeding.    2.  Patient with chronic diastolic heart failure doing well.  Medications have improved his symptomatology.  He appears euvolemic with only mild edema.  We can monitor.    3.  Patient with pulmonary hypertension.  Patient follows with pulmonology as well.  We can monitor his pulmonary pressures at this time.  Symptoms have improved on change of medical therapy.    4.  We can see him back for follow-up in 6 months or sooner if needed.  Follow-up with primary as scheduled.         Patient brought in medicine list to appointment, it's been reviewed with  patient and med list was updated in the chart.      Advance Care Planning  ACP discussion was declined by the patient. Patient has an advance directive (not in EMR), copy requested.           Electronically signed by:

## 2024-04-10 NOTE — PROGRESS NOTES
Problem list     Subjective   Kian Lopez is a 89 y.o. male     Chief Complaint   Patient presents with    Follow-up   Problem list  1.  Shortness of breath  1.1 echocardiogram 3/23: EF 60%, normal diastolic function, mild to moderate MR and TR, peak PA pressures of 66 mmHg  2.  Lower extremity edema  3.  Stroke  3.1 CTA of the neck no evidence of hemodynamically significant stenosis in the neck, small right pleural effusion  3.2 echocardiogram with no evidence of shunting.  4.  Severe pulmonary hypertension  4.1 echocardiogram suggestive of pulmonary pressures in the 60s.  5.  Atrial fibrillation  5 1 patient currently on Eliquis for anticoagulation  6.  Diastolic heart failure  7.  Pleural effusion status post thoracentesis July 2023       HPI    Patient is a 89-year-old male who presents back to the office for follow-up.  He has done relatively well.  Patient was recently placed on Entresto as well as Farxiga and notes significant improvement.  Patient has diastolic heart failure with severe pulm hypertension.  He has history of pleural effusion in the past    He has no chest pain or pressure and his dyspnea has improved.  He does not describe PND or orthopnea.  He continues to have mild lower extremity edema but it appears to be manageable.    He does not describe palpitating nor does he complain of dysrhythmic symptoms.  He does not describe any symptoms of bleeding on anticoagulation.  He does not describe symptoms of cerebral embolic events.  He is stable otherwise.    Current Outpatient Medications on File Prior to Visit   Medication Sig Dispense Refill    albuterol (PROVENTIL) (2.5 MG/3ML) 0.083% nebulizer solution Take 2.5 mg by nebulization Every 4 (Four) Hours As Needed for Wheezing.      apixaban (ELIQUIS) 2.5 MG tablet tablet Take 1 tablet by mouth 2 (Two) Times a Day.      aspirin 81 MG EC tablet Take 1 tablet by mouth Daily.      atorvastatin (LIPITOR) 40 MG tablet Take 1 tablet by mouth Daily.       doxazosin (CARDURA) 8 MG tablet Take 1 tablet by mouth Daily.      empagliflozin (JARDIANCE) 10 MG tablet tablet Take 1 tablet by mouth Daily. 90 tablet 3    fluticasone (FLONASE) 50 MCG/ACT nasal spray 1 spray into the nostril(s) as directed by provider Daily.      Furosemide (LASIX PO) Take 40 mg by mouth Daily.      hydrALAZINE (APRESOLINE) 50 MG tablet TAKE 1 TABLET BY MOUTH TWICE A DAY 60 tablet 5    LEVOTHYROXINE SODIUM PO Take 12.5 mcg by mouth.      Methylcobalamin (B12-ACTIVE PO) Take  by mouth.      montelukast (SINGULAIR) 10 MG tablet Take 1 tablet by mouth Daily.      oxybutynin XL (DITROPAN-XL) 10 MG 24 hr tablet Take 1 tablet by mouth Daily.      Potassium (POTASSIMIN PO) Take  by mouth.      sacubitril-valsartan (Entresto) 49-51 MG tablet Take 1 tablet by mouth 2 (Two) Times a Day. 180 tablet 3    fluticasone (FLOVENT HFA) 110 MCG/ACT inhaler Inhale 1 puff.       No current facility-administered medications on file prior to visit.       Patient has no known allergies.    Past Medical History:   Diagnosis Date    Stroke        Social History     Socioeconomic History    Marital status:    Tobacco Use    Smoking status: Never   Substance and Sexual Activity    Alcohol use: Not Currently    Drug use: Never    Sexual activity: Defer       Family History   Problem Relation Age of Onset    Heart attack Mother     Heart attack Father        Review of Systems   Constitutional: Negative.  Negative for activity change, appetite change, chills, fatigue and fever.   HENT: Negative.  Negative for congestion, sinus pressure and sinus pain.    Eyes: Negative.  Negative for visual disturbance.   Respiratory: Negative.  Negative for apnea, cough, chest tightness, shortness of breath and wheezing.    Cardiovascular:  Negative for chest pain, palpitations and leg swelling.   Gastrointestinal: Negative.  Negative for blood in stool.   Endocrine: Negative.  Negative for cold intolerance and heat intolerance.  "  Genitourinary: Negative.  Negative for hematuria.   Musculoskeletal: Negative.  Negative for gait problem.   Skin: Negative.  Negative for color change, rash and wound.   Allergic/Immunologic: Negative.  Negative for environmental allergies and food allergies.   Neurological: Negative.  Negative for dizziness, syncope, weakness, light-headedness, numbness and headaches.   Hematological: Negative.  Bruises/bleeds easily.   Psychiatric/Behavioral: Negative.  Negative for sleep disturbance.        Objective   Vitals:    04/10/24 1408   BP: 129/73   BP Location: Right arm   Patient Position: Sitting   Cuff Size: Adult   Pulse: 63   SpO2: 97%   Weight: 57.2 kg (126 lb)   Height: 154.9 cm (61\")      /73 (BP Location: Right arm, Patient Position: Sitting, Cuff Size: Adult)   Pulse 63   Ht 154.9 cm (61\")   Wt 57.2 kg (126 lb)   SpO2 97%   BMI 23.81 kg/m²     Lab Results (most recent)       None            Physical Exam  Vitals and nursing note reviewed.   Constitutional:       General: He is not in acute distress.     Appearance: Normal appearance. He is well-developed.   HENT:      Head: Normocephalic and atraumatic.   Eyes:      General: No scleral icterus.        Right eye: No discharge.         Left eye: No discharge.      Conjunctiva/sclera: Conjunctivae normal.   Neck:      Vascular: No carotid bruit.   Cardiovascular:      Rate and Rhythm: Normal rate. Rhythm irregularly irregular.      Heart sounds: Normal heart sounds. No murmur heard.     No friction rub. No gallop.   Pulmonary:      Effort: Pulmonary effort is normal. No respiratory distress.      Breath sounds: Normal breath sounds. No wheezing or rales.   Chest:      Chest wall: No tenderness.   Musculoskeletal:      Right lower le+ No edema.      Left lower le+ No edema.   Skin:     General: Skin is warm and dry.      Coloration: Skin is not pale.      Findings: No erythema or rash.   Neurological:      Mental Status: He is alert and " oriented to person, place, and time.      Cranial Nerves: No cranial nerve deficit.   Psychiatric:         Behavior: Behavior normal.         Procedure   Procedures       Assessment & Plan     Problems Addressed this Visit          Cardiac and Vasculature    Chronic diastolic heart failure - Primary    Atrial fibrillation, chronic       Pulmonary and Pneumonias    Pulmonary hypertension     Diagnoses         Codes Comments    Chronic diastolic heart failure    -  Primary ICD-10-CM: I50.32  ICD-9-CM: 428.32     Pulmonary hypertension     ICD-10-CM: I27.20  ICD-9-CM: 416.8     Atrial fibrillation, chronic     ICD-10-CM: I48.20  ICD-9-CM: 427.31           Recommendation  1.  Patient is a 89-year-old male presenting back to the office for routine cardiac assessment.  He has chronic atrial fibrillation doing well.  He has no complaints of bleeding on anticoagulation.  For now, we can monitor.  He feels stable at this time.  He has no complaints of bleeding.    2.  Patient with chronic diastolic heart failure doing well.  Medications have improved his symptomatology.  He appears euvolemic with only mild edema.  We can monitor.    3.  Patient with pulmonary hypertension.  Patient follows with pulmonology as well.  We can monitor his pulmonary pressures at this time.  Symptoms have improved on change of medical therapy.    4.  We can see him back for follow-up in 6 months or sooner if needed.  Follow-up with primary as scheduled.         Patient brought in medicine list to appointment, it's been reviewed with patient and med list was updated in the chart.      Advance Care Planning   ACP discussion was declined by the patient. Patient has an advance directive (not in EMR), copy requested.           Electronically signed by:

## 2024-05-29 RX ORDER — HYDRALAZINE HYDROCHLORIDE 50 MG/1
50 TABLET, FILM COATED ORAL 2 TIMES DAILY
Qty: 60 TABLET | Refills: 5 | Status: SHIPPED | OUTPATIENT
Start: 2024-05-29

## 2024-10-17 ENCOUNTER — OFFICE VISIT (OUTPATIENT)
Dept: CARDIOLOGY | Facility: CLINIC | Age: 89
End: 2024-10-17
Payer: MEDICARE

## 2024-10-17 VITALS
OXYGEN SATURATION: 97 % | HEIGHT: 64 IN | SYSTOLIC BLOOD PRESSURE: 129 MMHG | BODY MASS INDEX: 23.05 KG/M2 | HEART RATE: 51 BPM | DIASTOLIC BLOOD PRESSURE: 49 MMHG | WEIGHT: 135 LBS

## 2024-10-17 DIAGNOSIS — I48.20 ATRIAL FIBRILLATION, CHRONIC: ICD-10-CM

## 2024-10-17 DIAGNOSIS — I50.32 CHRONIC DIASTOLIC HEART FAILURE: Primary | ICD-10-CM

## 2024-10-17 DIAGNOSIS — I27.20 PULMONARY HYPERTENSION: ICD-10-CM

## 2024-10-17 PROCEDURE — 99214 OFFICE O/P EST MOD 30 MIN: CPT | Performed by: PHYSICIAN ASSISTANT

## 2024-10-17 NOTE — LETTER
October 17, 2024     Chon Borrego DO  [address] [place] [address]  [address]  [facility] [id number]    Patient: Kian Lopez   YOB: 1930   Date of Visit: 10/17/2024       Dear Chon Borrego DO    Kian Lopez was in my office today. Below is a copy of my note.    If you have questions, please do not hesitate to call me. I look forward to following Kian along with you.         Sincerely,        JANKI Roach        CC: No Recipients    Problem list     Subjective  Kian Lopez is a 89 y.o. male     Chief Complaint   Patient presents with   • Follow-up     Chronic diastolic heart failure     Problem list  1.  Shortness of breath  1.1 echocardiogram 3/23: EF 60%, normal diastolic function, mild to moderate MR and TR, peak PA pressures of 66 mmHg  2.  Lower extremity edema  3.  Stroke  3.1 CTA of the neck no evidence of hemodynamically significant stenosis in the neck, small right pleural effusion  3.2 echocardiogram with no evidence of shunting.  4.  Severe pulmonary hypertension  4.1 echocardiogram suggestive of pulmonary pressures in the 60s.  5.  Atrial fibrillation  5 1 patient currently on Eliquis for anticoagulation  6.  Diastolic heart failure  7.  Pleural effusion status post thoracentesis July 2023  HPI    Patient is a 89-year-old male presenting for evaluation.  He has known diastolic heart failure as well as severe pulmonary hypertension and has been treated medically.    Patient recently underwent repeat thoracentesis per his report because of her persistent pleural effusion.  Patient has been on medication to treat diastolic heart failure.  Patient is on 40 mg of Lasix daily.    Patient does not experience chest pain or pressure but does have a degree of dyspnea that is mild.  He does feel well.  He does have chronic lower extremity edema that appears to be stable.  He does not describe PND or orthopnea.    He does not palpitate nor does he complain of dysrhythmic symptoms.  He is  stable otherwise.      Current Outpatient Medications on File Prior to Visit   Medication Sig Dispense Refill   • albuterol (PROVENTIL) (2.5 MG/3ML) 0.083% nebulizer solution Take 2.5 mg by nebulization Every 4 (Four) Hours As Needed for Wheezing.     • apixaban (ELIQUIS) 2.5 MG tablet tablet Take 1 tablet by mouth 2 (Two) Times a Day.     • aspirin 81 MG EC tablet Take 1 tablet by mouth Daily.     • atorvastatin (LIPITOR) 40 MG tablet Take 1 tablet by mouth Daily.     • doxazosin (CARDURA) 8 MG tablet Take 1 tablet by mouth Daily.     • empagliflozin (JARDIANCE) 10 MG tablet tablet Take 1 tablet by mouth Daily. 90 tablet 3   • fluticasone (FLONASE) 50 MCG/ACT nasal spray Administer 1 spray into the nostril(s) as directed by provider Daily.     • fluticasone (FLOVENT HFA) 110 MCG/ACT inhaler Inhale 1 puff.     • Furosemide (LASIX PO) Take 40 mg by mouth Daily.     • hydrALAZINE (APRESOLINE) 50 MG tablet TAKE 1 TABLET BY MOUTH TWICE A DAY 60 tablet 5   • LEVOTHYROXINE SODIUM PO Take 12.5 mcg by mouth.     • Methylcobalamin (B12-ACTIVE PO) Take  by mouth.     • montelukast (SINGULAIR) 10 MG tablet Take 1 tablet by mouth Daily.     • oxybutynin XL (DITROPAN-XL) 10 MG 24 hr tablet Take 1 tablet by mouth Daily.     • Potassium (POTASSIMIN PO) Take  by mouth.     • sacubitril-valsartan (Entresto) 49-51 MG tablet Take 1 tablet by mouth 2 (Two) Times a Day. 180 tablet 3     No current facility-administered medications on file prior to visit.       Patient has no known allergies.    Past Medical History:   Diagnosis Date   • Stroke        Social History     Socioeconomic History   • Marital status:    Tobacco Use   • Smoking status: Never   Substance and Sexual Activity   • Alcohol use: Not Currently   • Drug use: Never   • Sexual activity: Defer       Family History   Problem Relation Age of Onset   • Heart attack Mother    • Heart attack Father        Review of Systems   Constitutional:  Positive for fatigue.  "Negative for activity change, appetite change, chills, fever and unexpected weight change.   HENT: Negative.  Negative for congestion, sinus pressure and sinus pain.    Eyes: Negative.  Negative for visual disturbance.   Respiratory:  Positive for shortness of breath. Negative for apnea, cough, chest tightness and wheezing.    Cardiovascular:  Positive for leg swelling (Rt leg, fell while in garden working. PCP is taking care of at this time.). Negative for chest pain and palpitations.   Gastrointestinal: Negative.  Negative for blood in stool.   Endocrine: Negative.  Negative for cold intolerance and heat intolerance.   Genitourinary: Negative.  Negative for hematuria.   Musculoskeletal: Negative.  Negative for gait problem.   Skin: Negative.  Negative for color change, rash and wound.   Allergic/Immunologic: Negative.  Negative for environmental allergies and food allergies.   Neurological: Negative.  Negative for dizziness, syncope, weakness, light-headedness, numbness and headaches.   Hematological:  Bruises/bleeds easily.   Psychiatric/Behavioral: Negative.  Negative for sleep disturbance.        Objective  Vitals:    10/17/24 1106   BP: 129/49   BP Location: Right arm   Patient Position: Sitting   Cuff Size: Adult   Pulse: 51   SpO2: 97%   Weight: 61.2 kg (135 lb)   Height: 162.6 cm (64\")      /49 (BP Location: Right arm, Patient Position: Sitting, Cuff Size: Adult)   Pulse 51   Ht 162.6 cm (64\")   Wt 61.2 kg (135 lb)   SpO2 97%   BMI 23.17 kg/m²     Lab Results (most recent)       None            Physical Exam  Vitals and nursing note reviewed.   Constitutional:       General: He is not in acute distress.     Appearance: Normal appearance. He is well-developed.   HENT:      Head: Normocephalic and atraumatic.   Eyes:      General: No scleral icterus.        Right eye: No discharge.         Left eye: No discharge.      Conjunctiva/sclera: Conjunctivae normal.   Neck:      Vascular: No carotid bruit. "   Cardiovascular:      Rate and Rhythm: Normal rate and regular rhythm. Rhythm irregularly irregular.      Heart sounds: Normal heart sounds. No murmur heard.     No friction rub. No gallop.   Pulmonary:      Effort: Pulmonary effort is normal. No respiratory distress.      Breath sounds: Normal breath sounds. No wheezing or rales.   Chest:      Chest wall: No tenderness.   Musculoskeletal:      Right lower le+ Edema present.      Left lower le+ Edema present.   Skin:     General: Skin is warm and dry.      Coloration: Skin is not pale.      Findings: No erythema or rash.   Neurological:      Mental Status: He is alert and oriented to person, place, and time.      Cranial Nerves: No cranial nerve deficit.   Psychiatric:         Behavior: Behavior normal.         Procedure  Procedures       Assessment & Plan    Problems Addressed this Visit          Cardiac and Vasculature    Chronic diastolic heart failure - Primary    Atrial fibrillation, chronic       Pulmonary and Pneumonias    Pulmonary hypertension     Diagnoses         Codes Comments    Chronic diastolic heart failure    -  Primary ICD-10-CM: I50.32  ICD-9-CM: 428.32     Atrial fibrillation, chronic     ICD-10-CM: I48.20  ICD-9-CM: 427.31     Pulmonary hypertension     ICD-10-CM: I27.20  ICD-9-CM: 416.8           Recommendations  1.  Patient is a 89-year-old male presenting for evaluation.  He has chronic diastolic heart failure and recently underwent thoracentesis because of her persistent pleural effusion per his report.  However, cytology is not back at this time.  There could be possibility that this could be related to his congestive failure, he only has mild edema.  Ultimately, we can await those results.  I did discuss increasing his Lasix but he does not want to increase that medication because of increased urination at this point.  For now, we will continue the same but if symptoms were to worsen, want him to call the office.    2.  Patient  with chronic atrial fibrillation doing well on rate control therapy.  I will make no change.    3.  Patient with severe pulmonary hypertension.  Patient will continue medical therapy at this time.  We will treat his failure and hopefully improved to a degree.  I am not sure how aggressive patient would want to treat his pulmonary hypertension at this time.  We can try to maximize him medically and evaluate and repeat echocardiogram.    4.  We will see patient back for follow-up as discussed.  Follow-up with primary as scheduled.           Patient did not bring med list or medicine bottles to appointment, med list has been reviewed and updated based on patient's knowledge of their meds.      Advance Care Planning  ACP discussion was declined by the patient. Patient has an advance directive (not in EMR), copy requested.        Electronically signed by:

## 2024-10-17 NOTE — PROGRESS NOTES
Problem list     Subjective   Kian Lopez is a 89 y.o. male     Chief Complaint   Patient presents with    Follow-up     Chronic diastolic heart failure     Problem list  1.  Shortness of breath  1.1 echocardiogram 3/23: EF 60%, normal diastolic function, mild to moderate MR and TR, peak PA pressures of 66 mmHg  2.  Lower extremity edema  3.  Stroke  3.1 CTA of the neck no evidence of hemodynamically significant stenosis in the neck, small right pleural effusion  3.2 echocardiogram with no evidence of shunting.  4.  Severe pulmonary hypertension  4.1 echocardiogram suggestive of pulmonary pressures in the 60s.  5.  Atrial fibrillation  5 1 patient currently on Eliquis for anticoagulation  6.  Diastolic heart failure  7.  Pleural effusion status post thoracentesis July 2023  HPI    Patient is a 89-year-old male presenting for evaluation.  He has known diastolic heart failure as well as severe pulmonary hypertension and has been treated medically.    Patient recently underwent repeat thoracentesis per his report because of her persistent pleural effusion.  Patient has been on medication to treat diastolic heart failure.  Patient is on 40 mg of Lasix daily.    Patient does not experience chest pain or pressure but does have a degree of dyspnea that is mild.  He does feel well.  He does have chronic lower extremity edema that appears to be stable.  He does not describe PND or orthopnea.    He does not palpitate nor does he complain of dysrhythmic symptoms.  He is stable otherwise.      Current Outpatient Medications on File Prior to Visit   Medication Sig Dispense Refill    albuterol (PROVENTIL) (2.5 MG/3ML) 0.083% nebulizer solution Take 2.5 mg by nebulization Every 4 (Four) Hours As Needed for Wheezing.      apixaban (ELIQUIS) 2.5 MG tablet tablet Take 1 tablet by mouth 2 (Two) Times a Day.      aspirin 81 MG EC tablet Take 1 tablet by mouth Daily.      atorvastatin (LIPITOR) 40 MG tablet Take 1 tablet by mouth  Daily.      doxazosin (CARDURA) 8 MG tablet Take 1 tablet by mouth Daily.      empagliflozin (JARDIANCE) 10 MG tablet tablet Take 1 tablet by mouth Daily. 90 tablet 3    fluticasone (FLONASE) 50 MCG/ACT nasal spray Administer 1 spray into the nostril(s) as directed by provider Daily.      fluticasone (FLOVENT HFA) 110 MCG/ACT inhaler Inhale 1 puff.      Furosemide (LASIX PO) Take 40 mg by mouth Daily.      hydrALAZINE (APRESOLINE) 50 MG tablet TAKE 1 TABLET BY MOUTH TWICE A DAY 60 tablet 5    LEVOTHYROXINE SODIUM PO Take 12.5 mcg by mouth.      Methylcobalamin (B12-ACTIVE PO) Take  by mouth.      montelukast (SINGULAIR) 10 MG tablet Take 1 tablet by mouth Daily.      oxybutynin XL (DITROPAN-XL) 10 MG 24 hr tablet Take 1 tablet by mouth Daily.      Potassium (POTASSIMIN PO) Take  by mouth.      sacubitril-valsartan (Entresto) 49-51 MG tablet Take 1 tablet by mouth 2 (Two) Times a Day. 180 tablet 3     No current facility-administered medications on file prior to visit.       Patient has no known allergies.    Past Medical History:   Diagnosis Date    Stroke        Social History     Socioeconomic History    Marital status:    Tobacco Use    Smoking status: Never   Substance and Sexual Activity    Alcohol use: Not Currently    Drug use: Never    Sexual activity: Defer       Family History   Problem Relation Age of Onset    Heart attack Mother     Heart attack Father        Review of Systems   Constitutional:  Positive for fatigue. Negative for activity change, appetite change, chills, fever and unexpected weight change.   HENT: Negative.  Negative for congestion, sinus pressure and sinus pain.    Eyes: Negative.  Negative for visual disturbance.   Respiratory:  Positive for shortness of breath. Negative for apnea, cough, chest tightness and wheezing.    Cardiovascular:  Positive for leg swelling (Rt leg, fell while in garden working. PCP is taking care of at this time.). Negative for chest pain and  "palpitations.   Gastrointestinal: Negative.  Negative for blood in stool.   Endocrine: Negative.  Negative for cold intolerance and heat intolerance.   Genitourinary: Negative.  Negative for hematuria.   Musculoskeletal: Negative.  Negative for gait problem.   Skin: Negative.  Negative for color change, rash and wound.   Allergic/Immunologic: Negative.  Negative for environmental allergies and food allergies.   Neurological: Negative.  Negative for dizziness, syncope, weakness, light-headedness, numbness and headaches.   Hematological:  Bruises/bleeds easily.   Psychiatric/Behavioral: Negative.  Negative for sleep disturbance.        Objective   Vitals:    10/17/24 1106   BP: 129/49   BP Location: Right arm   Patient Position: Sitting   Cuff Size: Adult   Pulse: 51   SpO2: 97%   Weight: 61.2 kg (135 lb)   Height: 162.6 cm (64\")      /49 (BP Location: Right arm, Patient Position: Sitting, Cuff Size: Adult)   Pulse 51   Ht 162.6 cm (64\")   Wt 61.2 kg (135 lb)   SpO2 97%   BMI 23.17 kg/m²     Lab Results (most recent)       None            Physical Exam  Vitals and nursing note reviewed.   Constitutional:       General: He is not in acute distress.     Appearance: Normal appearance. He is well-developed.   HENT:      Head: Normocephalic and atraumatic.   Eyes:      General: No scleral icterus.        Right eye: No discharge.         Left eye: No discharge.      Conjunctiva/sclera: Conjunctivae normal.   Neck:      Vascular: No carotid bruit.   Cardiovascular:      Rate and Rhythm: Normal rate and regular rhythm. Rhythm irregularly irregular.      Heart sounds: Normal heart sounds. No murmur heard.     No friction rub. No gallop.   Pulmonary:      Effort: Pulmonary effort is normal. No respiratory distress.      Breath sounds: Normal breath sounds. No wheezing or rales.   Chest:      Chest wall: No tenderness.   Musculoskeletal:      Right lower le+ Edema present.      Left lower le+ Edema present. "   Skin:     General: Skin is warm and dry.      Coloration: Skin is not pale.      Findings: No erythema or rash.   Neurological:      Mental Status: He is alert and oriented to person, place, and time.      Cranial Nerves: No cranial nerve deficit.   Psychiatric:         Behavior: Behavior normal.         Procedure   Procedures       Assessment & Plan     Problems Addressed this Visit          Cardiac and Vasculature    Chronic diastolic heart failure - Primary    Atrial fibrillation, chronic       Pulmonary and Pneumonias    Pulmonary hypertension     Diagnoses         Codes Comments    Chronic diastolic heart failure    -  Primary ICD-10-CM: I50.32  ICD-9-CM: 428.32     Atrial fibrillation, chronic     ICD-10-CM: I48.20  ICD-9-CM: 427.31     Pulmonary hypertension     ICD-10-CM: I27.20  ICD-9-CM: 416.8           Recommendations  1.  Patient is a 89-year-old male presenting for evaluation.  He has chronic diastolic heart failure and recently underwent thoracentesis because of her persistent pleural effusion per his report.  However, cytology is not back at this time.  There could be possibility that this could be related to his congestive failure, he only has mild edema.  Ultimately, we can await those results.  I did discuss increasing his Lasix but he does not want to increase that medication because of increased urination at this point.  For now, we will continue the same but if symptoms were to worsen, want him to call the office.    2.  Patient with chronic atrial fibrillation doing well on rate control therapy.  I will make no change.    3.  Patient with severe pulmonary hypertension.  Patient will continue medical therapy at this time.  We will treat his failure and hopefully improved to a degree.  I am not sure how aggressive patient would want to treat his pulmonary hypertension at this time.  We can try to maximize him medically and evaluate and repeat echocardiogram.    4.  We will see patient back for  follow-up as discussed.  Follow-up with primary as scheduled.           Patient did not bring med list or medicine bottles to appointment, med list has been reviewed and updated based on patient's knowledge of their meds.      Advance Care Planning   ACP discussion was declined by the patient. Patient has an advance directive (not in EMR), copy requested.        Electronically signed by:

## 2024-11-04 RX ORDER — HYDRALAZINE HYDROCHLORIDE 50 MG/1
50 TABLET, FILM COATED ORAL 2 TIMES DAILY
Qty: 180 TABLET | Refills: 3 | Status: SHIPPED | OUTPATIENT
Start: 2024-11-04

## 2025-07-10 ENCOUNTER — OFFICE VISIT (OUTPATIENT)
Dept: CARDIOLOGY | Facility: CLINIC | Age: OVER 89
End: 2025-07-10
Payer: MEDICARE

## 2025-07-10 VITALS
SYSTOLIC BLOOD PRESSURE: 163 MMHG | DIASTOLIC BLOOD PRESSURE: 61 MMHG | HEART RATE: 42 BPM | WEIGHT: 127 LBS | OXYGEN SATURATION: 98 % | HEIGHT: 64 IN | BODY MASS INDEX: 21.68 KG/M2

## 2025-07-10 DIAGNOSIS — I50.32 CHRONIC DIASTOLIC HEART FAILURE: Primary | ICD-10-CM

## 2025-07-10 DIAGNOSIS — I48.20 ATRIAL FIBRILLATION, CHRONIC: ICD-10-CM

## 2025-07-10 DIAGNOSIS — I10 ESSENTIAL HYPERTENSION: ICD-10-CM

## 2025-07-10 RX ORDER — CARBIDOPA AND LEVODOPA 25; 100 MG/1; MG/1
TABLET ORAL
COMMUNITY
Start: 2025-06-08

## 2025-07-10 NOTE — PROGRESS NOTES
Problem list     Subjective   Kian Lopez is a 89 y.o. male     Chief Complaint   Patient presents with    6 month follow up     Chronic diastolic heart failure     Problem list  1.  Shortness of breath  1.1 echocardiogram 3/23: EF 60%, normal diastolic function, mild to moderate MR and TR, peak PA pressures of 66 mmHg  2.  Lower extremity edema  3.  Stroke  3.1 CTA of the neck no evidence of hemodynamically significant stenosis in the neck, small right pleural effusion  3.2 echocardiogram with no evidence of shunting.  4.  Severe pulmonary hypertension  4.1 echocardiogram suggestive of pulmonary pressures in the 60s.  5.  Atrial fibrillation  5 1 patient currently on Eliquis for anticoagulation  6.  Diastolic heart failure  7.  Pleural effusion status post thoracentesis July 2023    HPI    Patient is a 89-year-old male back to the office for routine follow-up.  As above, he has history of A-fib and chronic diastolic failure.    Clinically, he feels well without any chest pain.  He does not describe any shortness of breath.  No complaints of PND or orthopnea.    He has chronic lower extremity edema that appears manageable.  He does not describe palpitations.  No complaints of dizziness, presyncope or syncope.  He does not describe any signs of blood loss on anticoagulation.  He is stable otherwise.      Current Outpatient Medications on File Prior to Visit   Medication Sig Dispense Refill    albuterol (PROVENTIL) (2.5 MG/3ML) 0.083% nebulizer solution Take 2.5 mg by nebulization Every 4 (Four) Hours As Needed for Wheezing.      apixaban (ELIQUIS) 2.5 MG tablet tablet Take 1 tablet by mouth 2 (Two) Times a Day.      aspirin 81 MG EC tablet Take 1 tablet by mouth Daily.      atorvastatin (LIPITOR) 40 MG tablet Take 1 tablet by mouth Daily.      carbidopa-levodopa (SINEMET)  MG per tablet       doxazosin (CARDURA) 8 MG tablet Take 1 tablet by mouth Daily.      empagliflozin (JARDIANCE) 10 MG tablet tablet Take 1  tablet by mouth Daily. 90 tablet 3    fluticasone (FLONASE) 50 MCG/ACT nasal spray Administer 1 spray into the nostril(s) as directed by provider Daily.      fluticasone (FLOVENT HFA) 110 MCG/ACT inhaler Inhale 1 puff.      Furosemide (LASIX PO) Take 40 mg by mouth Daily.      hydrALAZINE (APRESOLINE) 50 MG tablet TAKE 1 TABLET BY MOUTH 2 TIMES A  tablet 3    LEVOTHYROXINE SODIUM PO Take 12.5 mcg by mouth.      Methylcobalamin (B12-ACTIVE PO) Take  by mouth.      montelukast (SINGULAIR) 10 MG tablet Take 1 tablet by mouth Daily.      oxybutynin XL (DITROPAN-XL) 10 MG 24 hr tablet Take 1 tablet by mouth Daily.      Potassium (POTASSIMIN PO) Take  by mouth.      sacubitril-valsartan (Entresto) 49-51 MG tablet Take 1 tablet by mouth 2 (Two) Times a Day. 180 tablet 3     No current facility-administered medications on file prior to visit.       Patient has no known allergies.    Past Medical History:   Diagnosis Date    Stroke        Social History     Socioeconomic History    Marital status:    Tobacco Use    Smoking status: Never    Smokeless tobacco: Never   Vaping Use    Vaping status: Never Used   Substance and Sexual Activity    Alcohol use: Not Currently    Drug use: Never    Sexual activity: Defer       Family History   Problem Relation Age of Onset    Heart attack Mother     Heart attack Father        Review of Systems   Constitutional:  Negative for activity change, appetite change, chills, fatigue and fever.   HENT: Negative.  Negative for congestion, sinus pressure and sinus pain.    Eyes: Negative.  Negative for visual disturbance.   Respiratory: Negative.  Negative for apnea, choking, chest tightness, shortness of breath and wheezing.    Cardiovascular: Negative.  Negative for chest pain, palpitations and leg swelling.   Gastrointestinal: Negative.  Negative for blood in stool.   Endocrine: Negative for cold intolerance and heat intolerance.   Genitourinary: Negative.  Negative for hematuria.  "  Musculoskeletal: Negative.  Negative for gait problem.   Skin: Negative.  Negative for color change, rash and wound.   Allergic/Immunologic: Negative.  Negative for environmental allergies and food allergies.   Neurological:  Negative for dizziness, syncope, weakness, light-headedness, numbness and headaches.   Hematological: Negative.  Does not bruise/bleed easily.   Psychiatric/Behavioral: Negative.  Negative for sleep disturbance.        Objective   Vitals:    07/10/25 1418   BP: 163/61   BP Location: Left arm   Patient Position: Sitting   Cuff Size: Adult   Pulse: (!) 42   SpO2: 98%   Weight: 57.6 kg (127 lb)   Height: 162.6 cm (64\")      /61 (BP Location: Left arm, Patient Position: Sitting, Cuff Size: Adult)   Pulse (!) 42   Ht 162.6 cm (64\")   Wt 57.6 kg (127 lb)   SpO2 98%   BMI 21.80 kg/m²     Lab Results (most recent)       None            Physical Exam  Vitals and nursing note reviewed.   Constitutional:       General: He is not in acute distress.     Appearance: Normal appearance. He is well-developed.   HENT:      Head: Normocephalic and atraumatic.   Eyes:      General: No scleral icterus.        Right eye: No discharge.         Left eye: No discharge.      Conjunctiva/sclera: Conjunctivae normal.   Neck:      Vascular: No carotid bruit.   Cardiovascular:      Rate and Rhythm: Normal rate and regular rhythm.      Heart sounds: Normal heart sounds. No murmur heard.     No friction rub. No gallop.      Comments: Grade 1/6 systolic murmur right upper sternal border and left sternal border  Pulmonary:      Effort: Pulmonary effort is normal. No respiratory distress.      Breath sounds: Normal breath sounds. No wheezing or rales.   Chest:      Chest wall: No tenderness.   Musculoskeletal:      Right lower leg: Edema present.      Left lower leg: Edema present.   Skin:     General: Skin is warm and dry.      Coloration: Skin is not pale.      Findings: No erythema or rash.   Neurological:      " Mental Status: He is alert and oriented to person, place, and time.      Cranial Nerves: No cranial nerve deficit.   Psychiatric:         Behavior: Behavior normal.         Procedure     ECG 12 Lead    Date/Time: 7/10/2025 2:23 PM  Performed by: Chaparro Fajardo PA    Authorized by: Chaparro Fajardo PA  Comparison: compared with previous ECG from 4/10/2023  Comparison to previous ECG: EKG demonstrates A-fib at 45 bpm with PVC, right bundle branch block and no acute ST changes             Assessment & Plan     Problems Addressed this Visit          Cardiac and Vasculature    Essential hypertension    Relevant Orders    ECG 12 Lead    Chronic diastolic heart failure - Primary    Relevant Orders    ECG 12 Lead    Atrial fibrillation, chronic    Relevant Orders    ECG 12 Lead     Diagnoses         Codes Comments      Chronic diastolic heart failure    -  Primary ICD-10-CM: I50.32  ICD-9-CM: 428.32       Atrial fibrillation, chronic     ICD-10-CM: I48.20  ICD-9-CM: 427.31       Essential hypertension     ICD-10-CM: I10  ICD-9-CM: 401.9           Recommendations  1.  Patient is a 89-year-old male with chronic A-fib and I am concerned about his heart rate.  It is currently 45 bpm and he is not on any negative inotropic agents.  I discussed cardiac monitor.  He does not describe any symptoms of dizziness, presyncope or syncope.  He does not seem interested.  We discussed symptoms to monitor for.  If he develops them, want him to call the office.  Again, he does not seem interested in cardiac monitoring.    2.  Patient with chronic diastolic failure with mild edema.  It is manageable.  For now, he appears stable and we will monitor.    3.  Blood pressure apparently is much better at home although elevated today.  We can monitor for now.    4.  We will continue the same and see him back for follow-up in 6 months or sooner if needed.  Follow-up with primary as scheduled.             Kian John  reports that he has never  smoked. He has never used smokeless tobacco.     Patient brought in medicine list to appointment, it's been reviewed with patient and med list was updated in the chart.      Advance Care Planning   ACP discussion was declined by the patient. Patient has an advance directive (not in EMR), copy requested.        Electronically signed by:

## 2025-07-10 NOTE — LETTER
July 10, 2025     Cohn Borrego DO  [address] [place] [address]  [address]  [facility] [id number]    Patient: Kian Lopez   YOB: 1930   Date of Visit: 7/10/2025       Dear Chon Borrego DO    Kian Lopez was in my office today. Below is a copy of my note.    If you have questions, please do not hesitate to call me. I look forward to following Kian along with you.         Sincerely,        JANKI Roach        CC: No Recipients    Problem list     Subjective  Kian Lopez is a 89 y.o. male     Chief Complaint   Patient presents with   • 6 month follow up     Chronic diastolic heart failure     Problem list  1.  Shortness of breath  1.1 echocardiogram 3/23: EF 60%, normal diastolic function, mild to moderate MR and TR, peak PA pressures of 66 mmHg  2.  Lower extremity edema  3.  Stroke  3.1 CTA of the neck no evidence of hemodynamically significant stenosis in the neck, small right pleural effusion  3.2 echocardiogram with no evidence of shunting.  4.  Severe pulmonary hypertension  4.1 echocardiogram suggestive of pulmonary pressures in the 60s.  5.  Atrial fibrillation  5 1 patient currently on Eliquis for anticoagulation  6.  Diastolic heart failure  7.  Pleural effusion status post thoracentesis July 2023    HPI    Patient is a 89-year-old male back to the office for routine follow-up.  As above, he has history of A-fib and chronic diastolic failure.    Clinically, he feels well without any chest pain.  He does not describe any shortness of breath.  No complaints of PND or orthopnea.    He has chronic lower extremity edema that appears manageable.  He does not describe palpitations.  No complaints of dizziness, presyncope or syncope.  He does not describe any signs of blood loss on anticoagulation.  He is stable otherwise.      Current Outpatient Medications on File Prior to Visit   Medication Sig Dispense Refill   • albuterol (PROVENTIL) (2.5 MG/3ML) 0.083% nebulizer solution Take 2.5 mg by  nebulization Every 4 (Four) Hours As Needed for Wheezing.     • apixaban (ELIQUIS) 2.5 MG tablet tablet Take 1 tablet by mouth 2 (Two) Times a Day.     • aspirin 81 MG EC tablet Take 1 tablet by mouth Daily.     • atorvastatin (LIPITOR) 40 MG tablet Take 1 tablet by mouth Daily.     • carbidopa-levodopa (SINEMET)  MG per tablet      • doxazosin (CARDURA) 8 MG tablet Take 1 tablet by mouth Daily.     • empagliflozin (JARDIANCE) 10 MG tablet tablet Take 1 tablet by mouth Daily. 90 tablet 3   • fluticasone (FLONASE) 50 MCG/ACT nasal spray Administer 1 spray into the nostril(s) as directed by provider Daily.     • fluticasone (FLOVENT HFA) 110 MCG/ACT inhaler Inhale 1 puff.     • Furosemide (LASIX PO) Take 40 mg by mouth Daily.     • hydrALAZINE (APRESOLINE) 50 MG tablet TAKE 1 TABLET BY MOUTH 2 TIMES A  tablet 3   • LEVOTHYROXINE SODIUM PO Take 12.5 mcg by mouth.     • Methylcobalamin (B12-ACTIVE PO) Take  by mouth.     • montelukast (SINGULAIR) 10 MG tablet Take 1 tablet by mouth Daily.     • oxybutynin XL (DITROPAN-XL) 10 MG 24 hr tablet Take 1 tablet by mouth Daily.     • Potassium (POTASSIMIN PO) Take  by mouth.     • sacubitril-valsartan (Entresto) 49-51 MG tablet Take 1 tablet by mouth 2 (Two) Times a Day. 180 tablet 3     No current facility-administered medications on file prior to visit.       Patient has no known allergies.    Past Medical History:   Diagnosis Date   • Stroke        Social History     Socioeconomic History   • Marital status:    Tobacco Use   • Smoking status: Never   • Smokeless tobacco: Never   Vaping Use   • Vaping status: Never Used   Substance and Sexual Activity   • Alcohol use: Not Currently   • Drug use: Never   • Sexual activity: Defer       Family History   Problem Relation Age of Onset   • Heart attack Mother    • Heart attack Father        Review of Systems   Constitutional:  Negative for activity change, appetite change, chills, fatigue and fever.   HENT:  "Negative.  Negative for congestion, sinus pressure and sinus pain.    Eyes: Negative.  Negative for visual disturbance.   Respiratory: Negative.  Negative for apnea, choking, chest tightness, shortness of breath and wheezing.    Cardiovascular: Negative.  Negative for chest pain, palpitations and leg swelling.   Gastrointestinal: Negative.  Negative for blood in stool.   Endocrine: Negative for cold intolerance and heat intolerance.   Genitourinary: Negative.  Negative for hematuria.   Musculoskeletal: Negative.  Negative for gait problem.   Skin: Negative.  Negative for color change, rash and wound.   Allergic/Immunologic: Negative.  Negative for environmental allergies and food allergies.   Neurological:  Negative for dizziness, syncope, weakness, light-headedness, numbness and headaches.   Hematological: Negative.  Does not bruise/bleed easily.   Psychiatric/Behavioral: Negative.  Negative for sleep disturbance.        Objective  Vitals:    07/10/25 1418   BP: 163/61   BP Location: Left arm   Patient Position: Sitting   Cuff Size: Adult   Pulse: (!) 42   SpO2: 98%   Weight: 57.6 kg (127 lb)   Height: 162.6 cm (64\")      /61 (BP Location: Left arm, Patient Position: Sitting, Cuff Size: Adult)   Pulse (!) 42   Ht 162.6 cm (64\")   Wt 57.6 kg (127 lb)   SpO2 98%   BMI 21.80 kg/m²     Lab Results (most recent)       None            Physical Exam  Vitals and nursing note reviewed.   Constitutional:       General: He is not in acute distress.     Appearance: Normal appearance. He is well-developed.   HENT:      Head: Normocephalic and atraumatic.   Eyes:      General: No scleral icterus.        Right eye: No discharge.         Left eye: No discharge.      Conjunctiva/sclera: Conjunctivae normal.   Neck:      Vascular: No carotid bruit.   Cardiovascular:      Rate and Rhythm: Normal rate and regular rhythm.      Heart sounds: Normal heart sounds. No murmur heard.     No friction rub. No gallop.      Comments: " Grade 1/6 systolic murmur right upper sternal border and left sternal border  Pulmonary:      Effort: Pulmonary effort is normal. No respiratory distress.      Breath sounds: Normal breath sounds. No wheezing or rales.   Chest:      Chest wall: No tenderness.   Musculoskeletal:      Right lower leg: Edema present.      Left lower leg: Edema present.   Skin:     General: Skin is warm and dry.      Coloration: Skin is not pale.      Findings: No erythema or rash.   Neurological:      Mental Status: He is alert and oriented to person, place, and time.      Cranial Nerves: No cranial nerve deficit.   Psychiatric:         Behavior: Behavior normal.         Procedure    ECG 12 Lead    Date/Time: 7/10/2025 2:23 PM  Performed by: Chaparro Fajardo PA    Authorized by: Chaparro Fajardo PA  Comparison: compared with previous ECG from 4/10/2023  Comparison to previous ECG: EKG demonstrates A-fib at 45 bpm with PVC, right bundle branch block and no acute ST changes             Assessment & Plan    Problems Addressed this Visit          Cardiac and Vasculature    Essential hypertension    Relevant Orders    ECG 12 Lead    Chronic diastolic heart failure - Primary    Relevant Orders    ECG 12 Lead    Atrial fibrillation, chronic    Relevant Orders    ECG 12 Lead     Diagnoses         Codes Comments      Chronic diastolic heart failure    -  Primary ICD-10-CM: I50.32  ICD-9-CM: 428.32       Atrial fibrillation, chronic     ICD-10-CM: I48.20  ICD-9-CM: 427.31       Essential hypertension     ICD-10-CM: I10  ICD-9-CM: 401.9           Recommendations  1.  Patient is a 89-year-old male with chronic A-fib and I am concerned about his heart rate.  It is currently 45 bpm and he is not on any negative inotropic agents.  I discussed cardiac monitor.  He does not describe any symptoms of dizziness, presyncope or syncope.  He does not seem interested.  We discussed symptoms to monitor for.  If he develops them, want him to call the  office.  Again, he does not seem interested in cardiac monitoring.    2.  Patient with chronic diastolic failure with mild edema.  It is manageable.  For now, he appears stable and we will monitor.    3.  Blood pressure apparently is much better at home although elevated today.  We can monitor for now.    4.  We will continue the same and see him back for follow-up in 6 months or sooner if needed.  Follow-up with primary as scheduled.             Kian Lopez  reports that he has never smoked. He has never used smokeless tobacco.     Patient brought in medicine list to appointment, it's been reviewed with patient and med list was updated in the chart.      Advance Care Planning  ACP discussion was declined by the patient. Patient has an advance directive (not in EMR), copy requested.        Electronically signed by: